# Patient Record
Sex: MALE | Race: WHITE | Employment: FULL TIME | URBAN - METROPOLITAN AREA
[De-identification: names, ages, dates, MRNs, and addresses within clinical notes are randomized per-mention and may not be internally consistent; named-entity substitution may affect disease eponyms.]

---

## 2017-04-18 PROBLEM — M17.11 PRIMARY OSTEOARTHRITIS OF RIGHT KNEE: Status: ACTIVE | Noted: 2017-04-18

## 2017-04-18 PROBLEM — S83.241A ACUTE MEDIAL MENISCAL TEAR, RIGHT, INITIAL ENCOUNTER: Status: ACTIVE | Noted: 2017-04-18

## 2017-07-03 PROBLEM — S83.241D ACUTE MEDIAL MENISCAL TEAR, RIGHT, SUBSEQUENT ENCOUNTER: Status: ACTIVE | Noted: 2017-04-18

## 2017-07-09 PROBLEM — M25.661 DECREASED ROM OF RIGHT KNEE: Status: ACTIVE | Noted: 2017-07-09

## 2017-07-24 PROBLEM — Z13.6 SCREENING FOR CARDIOVASCULAR CONDITION: Status: ACTIVE | Noted: 2017-07-24

## 2018-05-23 ENCOUNTER — OFFICE VISIT (OUTPATIENT)
Dept: OTHER | Facility: HOSPITAL | Age: 63
End: 2018-05-23
Attending: FAMILY MEDICINE
Payer: OTHER MISCELLANEOUS

## 2018-05-23 ENCOUNTER — HOSPITAL ENCOUNTER (OUTPATIENT)
Dept: GENERAL RADIOLOGY | Facility: HOSPITAL | Age: 63
Discharge: HOME | End: 2018-05-23
Attending: FAMILY MEDICINE
Payer: OTHER MISCELLANEOUS

## 2018-05-23 DIAGNOSIS — R07.89 CHEST WALL PAIN: Primary | ICD-10-CM

## 2018-05-23 DIAGNOSIS — M25.512 ACUTE PAIN OF LEFT SHOULDER: ICD-10-CM

## 2018-05-23 PROCEDURE — 73030 X-RAY EXAM OF SHOULDER: CPT | Performed by: FAMILY MEDICINE

## 2018-05-23 PROCEDURE — 73000 X-RAY EXAM OF COLLAR BONE: CPT | Performed by: FAMILY MEDICINE

## 2018-05-23 RX ORDER — TRAMADOL HYDROCHLORIDE 50 MG/1
TABLET ORAL
Qty: 15 TABLET | Refills: 0 | Status: SHIPPED | OUTPATIENT
Start: 2018-05-23 | End: 2018-09-13 | Stop reason: ALTCHOICE

## 2018-05-25 ENCOUNTER — APPOINTMENT (OUTPATIENT)
Dept: OTHER | Facility: HOSPITAL | Age: 63
End: 2018-05-25
Attending: PREVENTIVE MEDICINE
Payer: OTHER MISCELLANEOUS

## 2018-05-29 ENCOUNTER — OFFICE VISIT (OUTPATIENT)
Dept: PHYSICAL THERAPY | Age: 63
End: 2018-05-29
Attending: INTERNAL MEDICINE
Payer: OTHER MISCELLANEOUS

## 2018-05-29 PROCEDURE — 97530 THERAPEUTIC ACTIVITIES: CPT

## 2018-05-29 PROCEDURE — 97162 PT EVAL MOD COMPLEX 30 MIN: CPT

## 2018-05-29 NOTE — PROGRESS NOTES
UPPER EXTREMITY EVALUATION:   Referring Physician: Dr. Alanna Luther  Diagnosis: Left shoulder strain (X14.366D)       Date of Service: 5/29/2018     PATIENT SUMMARY   Oswald Copeland is a 63year old y/o male who presents to therapy today with compl perform work duties including shoveling, lifting without shoulder pain. Pt goals include \"get better\". Past medical history was reviewed with Emmie Runner.  Significant findings include hx kidney cancer, R knee arthroscopic meniscus repair 06/17        ASSESSMEN 5/5  Extension: R 5/5; L 5/5  Wrist flexion: 5/5 B  Wrist extension: 5/5 B Rhomboids: R5/5, L 5/5  Mid trap: R 5/5; L 4/5   Lats: R 5/5, L 4+/5  Low trap: R 4+/5; L 4+/5     Special tests:   Nicole-Anatoliy Impingement Sign: R -, L +  Painful Arc Sign: R - strength to 5/5 to promote improved shoulder mechanics and stabilization with ADL such as lifting and reaching   · Pt will be independent and compliant with comprehensive HEP to maintain progress achieved in PT      Frequency / Duration: Patient will be se

## 2018-05-31 ENCOUNTER — APPOINTMENT (OUTPATIENT)
Dept: OTHER | Facility: HOSPITAL | Age: 63
End: 2018-05-31
Attending: PREVENTIVE MEDICINE
Payer: OTHER MISCELLANEOUS

## 2018-06-05 ENCOUNTER — OFFICE VISIT (OUTPATIENT)
Dept: PHYSICAL THERAPY | Age: 63
End: 2018-06-05
Attending: PREVENTIVE MEDICINE
Payer: OTHER MISCELLANEOUS

## 2018-06-05 DIAGNOSIS — S46.912A LEFT SHOULDER STRAIN: ICD-10-CM

## 2018-06-05 PROCEDURE — 97140 MANUAL THERAPY 1/> REGIONS: CPT

## 2018-06-05 PROCEDURE — 97110 THERAPEUTIC EXERCISES: CPT

## 2018-06-05 PROCEDURE — 97530 THERAPEUTIC ACTIVITIES: CPT

## 2018-06-05 NOTE — PROGRESS NOTES
Dx: Left shoulder strain (I53.069E)               Authorized # of Visits:  6 visits         Next MD visit: none scheduled  Fall Risk: standard         Precautions: 5 lb weight restriction     Subjective: Felt confident leaving after seeing Aria for [employer] retraction for 1-2 days for neuromuscular re-education of postural cues and positioning GH joint more posteriorly.    Date: 6/5/2018 TX#: 2/6 Date:               TX#: 3/   Date:               TX#: 4/ Date:               TX#: 5/ Date:               TX#: 6/ D pain. Denies issues with getting dressed. Pt reports fatigues with driving, especially if arm resting on open window when driving. Notes some symptoms in \"pec wall and in trapezius muscle\". Pt has follow-up with physician on Thursday (5/31/18) morning. including: lifting, shoveling, driving >1 hour. David Mckeon would benefit from skilled Physical Therapy to address the above impairments to return to [facility] and return to work.      Precautions:  None  OBJECTIVE:   Observation/Posture: forward head/rounded shoulde examination and history, this evaluation shows involvement of 3-4 body structures involved / activity limitations, 1-2 personal factors / comorbidities and the condition is evolving due to changing clinical characteristics indicating moderate complexity. [phone]    Sincerely,  Electronically signed by therapist: Daisha Lim, PT    Physician's certification required: Yes  I certify the need for these services furnished under this plan of treatment and while under my care.     X__________________________

## 2018-06-07 ENCOUNTER — OFFICE VISIT (OUTPATIENT)
Dept: PHYSICAL THERAPY | Age: 63
End: 2018-06-07
Attending: PREVENTIVE MEDICINE
Payer: OTHER MISCELLANEOUS

## 2018-06-07 DIAGNOSIS — S46.912A LEFT SHOULDER STRAIN: ICD-10-CM

## 2018-06-07 PROCEDURE — 97530 THERAPEUTIC ACTIVITIES: CPT

## 2018-06-07 PROCEDURE — 97110 THERAPEUTIC EXERCISES: CPT

## 2018-06-07 NOTE — PROGRESS NOTES
Dx: Left shoulder strain (U74.828B)               Authorized # of Visits:  6 visits         Next MD visit: none scheduled  Fall Risk: standard         Precautions: 5 lb weight restriction     Subjective: Tender after therapy. Notice some more popping.  Pull Continue PT to improve ROM, AC and GH joint mobility, and posterior cuff/scapular functional strength. Patient would also benefit from cervical stretching.  He may benefit from Storm taping for shoulder retraction for 1-2 days for neuromuscular re-educa shoveling. Later in the afternoon, felt fatigue and weakness in arm. Pt reached across body in the shower and had sharp pain. Sitting on the couch later that night, had pain in \"shoulder area and joint area\".  Pt reports some throbbing in collar bone area anterior shoulder joint; painful with overpressure into external rotation of shoulder; muscle tightness to include L pectoralis and B UT; mild postural weakness to include 4/5 at L middle trap; hypomobility clavicle at [facility] joint on L and positive Hawkin's K symmetrical and -    Today’s Treatment and Response: Patient education provided on exam findings, healing process, pain education, proper body mechanics, activity modification, correct exercise technique, POC.    Patient was instructed in and issued a HEP f exercise program instructions;     Education or treatment limitation: None  Rehab Potential:excellent    FOTO: 54/100      Patient/Family/Caregiver was advised of these findings, precautions, and treatment options and has agreed to actively participate in

## 2018-06-08 ENCOUNTER — OFFICE VISIT (OUTPATIENT)
Dept: PHYSICAL THERAPY | Age: 63
End: 2018-06-08
Attending: INTERNAL MEDICINE
Payer: OTHER MISCELLANEOUS

## 2018-06-08 PROCEDURE — 97110 THERAPEUTIC EXERCISES: CPT

## 2018-06-08 PROCEDURE — 97140 MANUAL THERAPY 1/> REGIONS: CPT

## 2018-06-08 PROCEDURE — 97112 NEUROMUSCULAR REEDUCATION: CPT

## 2018-06-08 NOTE — PROGRESS NOTES
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  Didn't waist to head height of 25# with no increase in shoulder pain  · Pt will demonstrate increased mid/low trap strength to 5/5 to promote improved shoulder mechanics and stabilization with ADL such as lifting and reaching   · Pt will be independent and compli performed [address] inferior glide end grade 2 for pain relief x 15 total       Skilled Services:  To restore shoulder ROM and improve GH position with posterior cuff and scapular training    Charges:  Manual x 1 (15), TE x 2 (29), NM x 1 (10)   Total Timed Treatm soreness all the time, 3/10 currently, can get to 0/10 with aspirin and light activity, at worst last 48 hours up to 5/10 .    Current functional limitations include washing self in shower, lifting (has weight restriction), driving greater >1 hour, shovelin PROM:   Shoulder Overpressure     ER: R pain; L pain     Accessory motion: symmetrical but hypomobile in [address] joint, decreased AC joint mobility at clavicle B      Strength/MMT:  Shoulder Elbow Scapular   Flexion: R 5/5; L 5/5  Abduction: R 5/5; L 5/ be able to reach in back pocket, tuck in shirt, and turn steering wheel without pain  · Pt will lift load from ground to waist height of 50# with no increase in shoulder pain  · Pt will lift load from waist to head height of 25# with no increase in shoulde

## 2018-06-12 ENCOUNTER — OFFICE VISIT (OUTPATIENT)
Dept: PHYSICAL THERAPY | Age: 63
End: 2018-06-12
Attending: PREVENTIVE MEDICINE
Payer: OTHER MISCELLANEOUS

## 2018-06-12 DIAGNOSIS — S46.912A LEFT SHOULDER STRAIN: ICD-10-CM

## 2018-06-12 PROCEDURE — 97140 MANUAL THERAPY 1/> REGIONS: CPT

## 2018-06-12 PROCEDURE — 97110 THERAPEUTIC EXERCISES: CPT

## 2018-06-12 PROCEDURE — 97112 NEUROMUSCULAR REEDUCATION: CPT

## 2018-06-12 NOTE — PROGRESS NOTES
Progress  Summary    Dx: Left shoulder strain (F07.853P)               Authorized # of Visits:  6 visits         Next MD visit: June 14th, 2018  Fall Risk: standard         Precautions: 5 lb weight restriction     Pt has attended 5, cancelled 0, and n pain at end range at top of shoulder joint, L IR 85 with pain at end range. 2.Tender at L long head and short head of bicep. 3.  Positive empty and full can test with pain greatest at empty can. Patient able to perform cross arm test without pain.    4 PT    [de-identified] certification required: Yes  I certify the need for these services furnished under this plan of treatment and while under my care.     X___________________________________________________ Date____________________    Certification From: 6/1 inferior glide end grade 2 for pain relief x 15 total       Skilled Services:  To restore shoulder ROM and improve GH position with posterior cuff and scapular training    Charges:  Manual x 1 (10), TE x 2 (25), NM x 1 (10)   Total Timed Treatment: 45 min time, 3/10 currently, can get to 0/10 with aspirin and light activity, at worst last 48 hours up to 5/10 . Current functional limitations include washing self in shower, lifting (has weight restriction), driving greater >1 hour, shoveling.    Michelle [facility]ter Shoulder Overpressure     ER: R pain; L pain     Accessory motion: symmetrical but hypomobile in [address] joint, decreased AC joint mobility at clavicle B      Strength/MMT:  Shoulder Elbow Scapular   Flexion: R 5/5; L 5/5  Abduction: R 5/5; L 5/5  ER: R 5/5; reach in back pocket, tuck in shirt, and turn steering wheel without pain  · Pt will lift load from ground to waist height of 50# with no increase in shoulder pain  · Pt will lift load from waist to head height of 25# with no increase in shoulder pain  · P

## 2018-06-14 ENCOUNTER — TELEPHONE (OUTPATIENT)
Dept: PHYSICAL THERAPY | Age: 63
End: 2018-06-14

## 2018-06-14 ENCOUNTER — APPOINTMENT (OUTPATIENT)
Dept: PHYSICAL THERAPY | Age: 63
End: 2018-06-14
Attending: PREVENTIVE MEDICINE
Payer: OTHER MISCELLANEOUS

## 2018-06-14 ENCOUNTER — APPOINTMENT (OUTPATIENT)
Dept: OTHER | Facility: HOSPITAL | Age: 63
End: 2018-06-14
Attending: PREVENTIVE MEDICINE
Payer: OTHER MISCELLANEOUS

## 2018-06-19 ENCOUNTER — APPOINTMENT (OUTPATIENT)
Dept: PHYSICAL THERAPY | Age: 63
End: 2018-06-19
Attending: PREVENTIVE MEDICINE
Payer: OTHER MISCELLANEOUS

## 2018-06-21 ENCOUNTER — APPOINTMENT (OUTPATIENT)
Dept: PHYSICAL THERAPY | Age: 63
End: 2018-06-21
Attending: PREVENTIVE MEDICINE
Payer: OTHER MISCELLANEOUS

## 2018-06-21 ENCOUNTER — APPOINTMENT (OUTPATIENT)
Dept: OTHER | Facility: HOSPITAL | Age: 63
End: 2018-06-21
Attending: PREVENTIVE MEDICINE
Payer: OTHER MISCELLANEOUS

## 2018-06-26 ENCOUNTER — APPOINTMENT (OUTPATIENT)
Dept: PHYSICAL THERAPY | Age: 63
End: 2018-06-26
Attending: PREVENTIVE MEDICINE
Payer: OTHER MISCELLANEOUS

## 2018-06-28 ENCOUNTER — APPOINTMENT (OUTPATIENT)
Dept: PHYSICAL THERAPY | Age: 63
End: 2018-06-28
Attending: PREVENTIVE MEDICINE
Payer: OTHER MISCELLANEOUS

## 2018-07-03 ENCOUNTER — APPOINTMENT (OUTPATIENT)
Dept: PHYSICAL THERAPY | Age: 63
End: 2018-07-03
Attending: PREVENTIVE MEDICINE
Payer: OTHER MISCELLANEOUS

## 2018-07-05 ENCOUNTER — APPOINTMENT (OUTPATIENT)
Dept: PHYSICAL THERAPY | Age: 63
End: 2018-07-05
Attending: PREVENTIVE MEDICINE
Payer: OTHER MISCELLANEOUS

## 2018-07-12 PROBLEM — M75.122 COMPLETE TEAR OF LEFT ROTATOR CUFF: Status: ACTIVE | Noted: 2018-07-12

## 2018-07-26 PROBLEM — S43.432D SUPERIOR GLENOID LABRUM LESION OF LEFT SHOULDER, SUBSEQUENT ENCOUNTER: Status: ACTIVE | Noted: 2018-07-26

## 2019-02-01 PROBLEM — Z47.89 ORTHOPEDIC AFTERCARE: Status: ACTIVE | Noted: 2019-02-01

## 2021-02-03 NOTE — IMAGING NOTE
Call placed to pt regarding CTA Gated Thoracic on 2/5. Left a detailed voice message. Instructed to arrive 15 mins before procedure. Instructed to drink plenty of fluids a day prior to procedure and day of procedure. May eat a light breakfast/lunch.  Gaylin Litten

## 2021-02-05 ENCOUNTER — HOSPITAL ENCOUNTER (OUTPATIENT)
Dept: CT IMAGING | Facility: HOSPITAL | Age: 66
Discharge: HOME | End: 2021-02-05
Attending: INTERNAL MEDICINE
Payer: COMMERCIAL

## 2021-02-22 NOTE — IMAGING NOTE
Call placed to pt regarding CTA Gated Thoracic on 2/26/21. Instructed to arrive 15 mins before procedure. Instructed to drink plenty of fluids a day prior to procedure and day of procedure.  May eat a light breakfast. Advised to hold caffeine 12 hrs prior

## 2021-02-26 ENCOUNTER — HOSPITAL ENCOUNTER (OUTPATIENT)
Dept: CT IMAGING | Facility: HOSPITAL | Age: 66
Discharge: HOME | End: 2021-02-26
Attending: INTERNAL MEDICINE
Payer: COMMERCIAL

## 2021-02-26 VITALS — DIASTOLIC BLOOD PRESSURE: 90 MMHG | SYSTOLIC BLOOD PRESSURE: 139 MMHG | HEART RATE: 60 BPM

## 2021-02-26 DIAGNOSIS — I77.89 ENLARGED AORTA (HCC): ICD-10-CM

## 2021-02-26 LAB — CREAT BLD-MCNC: 1.5 MG/DL

## 2021-02-26 PROCEDURE — 82565 ASSAY OF CREATININE: CPT

## 2021-02-26 PROCEDURE — 71275 CT ANGIOGRAPHY CHEST: CPT | Performed by: INTERNAL MEDICINE

## 2021-02-26 NOTE — IMAGING NOTE
Received Az Churchill to CT Rm 4 [facility]. Verified name, , allergies. . IV access with 20G angiocath to right antecubital area. O2 applied via nasal cannula @ 2LPM.  Positioned pt on table.  Right AC, 20 GProcedure explain

## 2021-03-29 PROBLEM — M23.92 INTERNAL DERANGEMENT OF KNEE, LEFT: Status: ACTIVE | Noted: 2021-03-29

## 2021-04-05 PROBLEM — S83.242D ACUTE MEDIAL MENISCAL TEAR, LEFT, SUBSEQUENT ENCOUNTER: Status: ACTIVE | Noted: 2021-04-05

## 2021-04-22 PROBLEM — M25.662 DECREASED RANGE OF MOTION (ROM) OF LEFT KNEE: Status: ACTIVE | Noted: 2021-04-22

## 2021-12-03 ENCOUNTER — OCC HEALTH (OUTPATIENT)
Dept: OTHER | Facility: HOSPITAL | Age: 66
End: 2021-12-03
Attending: PREVENTIVE MEDICINE

## 2021-12-03 ENCOUNTER — HOSPITAL ENCOUNTER (OUTPATIENT)
Dept: GENERAL RADIOLOGY | Facility: HOSPITAL | Age: 66
Discharge: HOME | End: 2021-12-03
Attending: PREVENTIVE MEDICINE

## 2021-12-03 DIAGNOSIS — S83.91XA RIGHT KNEE SPRAIN: Primary | ICD-10-CM

## 2021-12-03 DIAGNOSIS — S83.91XA RIGHT KNEE SPRAIN: ICD-10-CM

## 2021-12-03 PROCEDURE — 73562 X-RAY EXAM OF KNEE 3: CPT | Performed by: PREVENTIVE MEDICINE

## 2021-12-08 ENCOUNTER — OFFICE VISIT (OUTPATIENT)
Dept: OTHER | Facility: HOSPITAL | Age: 66
End: 2021-12-08
Attending: PREVENTIVE MEDICINE

## 2021-12-08 DIAGNOSIS — S86.911D KNEE STRAIN, RIGHT, SUBSEQUENT ENCOUNTER: ICD-10-CM

## 2021-12-08 DIAGNOSIS — M25.561 ACUTE PAIN OF RIGHT KNEE: Primary | ICD-10-CM

## 2021-12-20 ENCOUNTER — APPOINTMENT (OUTPATIENT)
Dept: OTHER | Facility: HOSPITAL | Age: 66
End: 2021-12-20
Attending: PREVENTIVE MEDICINE

## 2023-12-22 ENCOUNTER — TELEPHONIC VISIT (OUTPATIENT)
Dept: URGENT CARE | Age: 68
End: 2023-12-22

## 2023-12-22 VITALS
WEIGHT: 203.48 LBS | HEART RATE: 74 BPM | BODY MASS INDEX: 30.14 KG/M2 | HEIGHT: 69 IN | TEMPERATURE: 98 F | DIASTOLIC BLOOD PRESSURE: 94 MMHG | OXYGEN SATURATION: 97 % | SYSTOLIC BLOOD PRESSURE: 142 MMHG

## 2023-12-22 DIAGNOSIS — Z20.822 EXPOSURE TO COVID-19 VIRUS: Primary | ICD-10-CM

## 2023-12-22 DIAGNOSIS — J06.9 ACUTE UPPER RESPIRATORY INFECTION: ICD-10-CM

## 2023-12-22 LAB
FLUAV AG UPPER RESP QL IA.RAPID: NEGATIVE
FLUBV AG UPPER RESP QL IA.RAPID: NEGATIVE
SARS-COV+SARS-COV-2 AG RESP QL IA.RAPID: NOT DETECTED
TEST LOT EXPIRATION DATE: NORMAL
TEST LOT NUMBER: NORMAL

## 2023-12-22 PROCEDURE — 87428 SARSCOV & INF VIR A&B AG IA: CPT | Performed by: NURSE PRACTITIONER

## 2023-12-22 PROCEDURE — 99203 OFFICE O/P NEW LOW 30 MIN: CPT | Performed by: NURSE PRACTITIONER

## 2023-12-22 ASSESSMENT — ENCOUNTER SYMPTOMS
SORE THROAT: 0
RHINORRHEA: 1
CONSTITUTIONAL NEGATIVE: 1
RESPIRATORY NEGATIVE: 1
EYES NEGATIVE: 1

## 2024-09-17 ENCOUNTER — APPOINTMENT (OUTPATIENT)
Dept: GENERAL RADIOLOGY | Age: 69
End: 2024-09-17
Attending: EMERGENCY MEDICINE
Payer: COMMERCIAL

## 2024-09-17 ENCOUNTER — HOSPITAL ENCOUNTER (INPATIENT)
Facility: HOSPITAL | Age: 69
LOS: 3 days | Discharge: HOME | End: 2024-09-20
Attending: EMERGENCY MEDICINE | Admitting: HOSPITALIST
Payer: COMMERCIAL

## 2024-09-17 ENCOUNTER — APPOINTMENT (OUTPATIENT)
Dept: INTERVENTIONAL RADIOLOGY/VASCULAR | Facility: HOSPITAL | Age: 69
End: 2024-09-17
Attending: INTERNAL MEDICINE
Payer: COMMERCIAL

## 2024-09-17 ENCOUNTER — APPOINTMENT (OUTPATIENT)
Dept: CV DIAGNOSTICS | Facility: HOSPITAL | Age: 69
End: 2024-09-17
Attending: INTERNAL MEDICINE
Payer: COMMERCIAL

## 2024-09-17 DIAGNOSIS — I77.89 ENLARGED THORACIC AORTA (HCC): Primary | ICD-10-CM

## 2024-09-17 DIAGNOSIS — I21.3 ST ELEVATION MYOCARDIAL INFARCTION (STEMI), UNSPECIFIED ARTERY (HCC): ICD-10-CM

## 2024-09-17 PROBLEM — R07.9 CHEST PAIN OF UNCERTAIN ETIOLOGY: Status: ACTIVE | Noted: 2024-09-17

## 2024-09-17 LAB
ALBUMIN SERPL-MCNC: 4 G/DL (ref 3.4–5)
ALBUMIN/GLOB SERPL: 1.2 {RATIO} (ref 1–2)
ALP LIVER SERPL-CCNC: 80 U/L
ALT SERPL-CCNC: 29 U/L
ANION GAP SERPL CALC-SCNC: 6 MMOL/L (ref 0–18)
APTT PPP: 26.2 SECONDS (ref 23–36)
AST SERPL-CCNC: 16 U/L (ref 15–37)
BASOPHILS # BLD AUTO: 0.11 X10(3) UL (ref 0–0.2)
BASOPHILS NFR BLD AUTO: 1.3 %
BILIRUB SERPL-MCNC: 0.8 MG/DL (ref 0.1–2)
BUN BLD-MCNC: 23 MG/DL (ref 9–23)
CALCIUM BLD-MCNC: 9.8 MG/DL (ref 8.5–10.1)
CHLORIDE SERPL-SCNC: 105 MMOL/L (ref 98–112)
CHOLEST SERPL-MCNC: 160 MG/DL (ref ?–200)
CO2 SERPL-SCNC: 26 MMOL/L (ref 21–32)
CREAT BLD-MCNC: 1.75 MG/DL
EGFRCR SERPLBLD CKD-EPI 2021: 42 ML/MIN/1.73M2 (ref 60–?)
EOSINOPHIL # BLD AUTO: 0.37 X10(3) UL (ref 0–0.7)
EOSINOPHIL NFR BLD AUTO: 4.3 %
ERYTHROCYTE [DISTWIDTH] IN BLOOD BY AUTOMATED COUNT: 13.1 %
GLOBULIN PLAS-MCNC: 3.3 G/DL (ref 2.8–4.4)
GLUCOSE BLD-MCNC: 115 MG/DL (ref 70–99)
HCT VFR BLD AUTO: 47.2 %
HDLC SERPL-MCNC: 43 MG/DL (ref 40–59)
HGB BLD-MCNC: 16.9 G/DL
IMM GRANULOCYTES # BLD AUTO: 0.02 X10(3) UL (ref 0–1)
IMM GRANULOCYTES NFR BLD: 0.2 %
LDLC SERPL CALC-MCNC: 98 MG/DL (ref ?–100)
LYMPHOCYTES # BLD AUTO: 3.15 X10(3) UL (ref 1–4)
LYMPHOCYTES NFR BLD AUTO: 36.6 %
MCH RBC QN AUTO: 30.2 PG (ref 26–34)
MCHC RBC AUTO-ENTMCNC: 35.8 G/DL (ref 31–37)
MCV RBC AUTO: 84.3 FL
MONOCYTES # BLD AUTO: 0.95 X10(3) UL (ref 0.1–1)
MONOCYTES NFR BLD AUTO: 11 %
MRSA DNA SPEC QL NAA+PROBE: NEGATIVE
NEUTROPHILS # BLD AUTO: 4 X10 (3) UL (ref 1.5–7.7)
NEUTROPHILS # BLD AUTO: 4 X10(3) UL (ref 1.5–7.7)
NEUTROPHILS NFR BLD AUTO: 46.6 %
NONHDLC SERPL-MCNC: 117 MG/DL (ref ?–130)
OSMOLALITY SERPL CALC.SUM OF ELEC: 289 MOSM/KG (ref 275–295)
PLATELET # BLD AUTO: 241 10(3)UL (ref 150–450)
POTASSIUM SERPL-SCNC: 3.9 MMOL/L (ref 3.5–5.1)
PROT SERPL-MCNC: 7.3 G/DL (ref 6.4–8.2)
RBC # BLD AUTO: 5.6 X10(6)UL
SODIUM SERPL-SCNC: 137 MMOL/L (ref 136–145)
TRIGL SERPL-MCNC: 102 MG/DL (ref 30–149)
TROPONIN I SERPL HS-MCNC: 172 NG/L
TROPONIN I SERPL HS-MCNC: 37 NG/L
VLDLC SERPL CALC-MCNC: 17 MG/DL (ref 0–30)
WBC # BLD AUTO: 8.6 X10(3) UL (ref 4–11)

## 2024-09-17 PROCEDURE — 87641 MR-STAPH DNA AMP PROBE: CPT | Performed by: HOSPITALIST

## 2024-09-17 PROCEDURE — 93005 ELECTROCARDIOGRAM TRACING: CPT

## 2024-09-17 PROCEDURE — 93010 ELECTROCARDIOGRAM REPORT: CPT | Performed by: INTERNAL MEDICINE

## 2024-09-17 PROCEDURE — 84484 ASSAY OF TROPONIN QUANT: CPT

## 2024-09-17 PROCEDURE — 85025 COMPLETE CBC W/AUTO DIFF WBC: CPT

## 2024-09-17 PROCEDURE — 85730 THROMBOPLASTIN TIME PARTIAL: CPT | Performed by: EMERGENCY MEDICINE

## 2024-09-17 PROCEDURE — 96375 TX/PRO/DX INJ NEW DRUG ADDON: CPT

## 2024-09-17 PROCEDURE — 71045 X-RAY EXAM CHEST 1 VIEW: CPT | Performed by: EMERGENCY MEDICINE

## 2024-09-17 PROCEDURE — 93306 TTE W/DOPPLER COMPLETE: CPT | Performed by: INTERNAL MEDICINE

## 2024-09-17 PROCEDURE — 80053 COMPREHEN METABOLIC PANEL: CPT

## 2024-09-17 PROCEDURE — 93010 ELECTROCARDIOGRAM REPORT: CPT

## 2024-09-17 PROCEDURE — 99285 EMERGENCY DEPT VISIT HI MDM: CPT

## 2024-09-17 PROCEDURE — 99291 CRITICAL CARE FIRST HOUR: CPT

## 2024-09-17 PROCEDURE — 84484 ASSAY OF TROPONIN QUANT: CPT | Performed by: EMERGENCY MEDICINE

## 2024-09-17 PROCEDURE — 99153 MOD SED SAME PHYS/QHP EA: CPT | Performed by: INTERNAL MEDICINE

## 2024-09-17 PROCEDURE — 4A023N7 MEASUREMENT OF CARDIAC SAMPLING AND PRESSURE, LEFT HEART, PERCUTANEOUS APPROACH: ICD-10-PCS | Performed by: INTERNAL MEDICINE

## 2024-09-17 PROCEDURE — 80061 LIPID PANEL: CPT | Performed by: EMERGENCY MEDICINE

## 2024-09-17 PROCEDURE — 96365 THER/PROPH/DIAG IV INF INIT: CPT

## 2024-09-17 PROCEDURE — B211YZZ FLUOROSCOPY OF MULTIPLE CORONARY ARTERIES USING OTHER CONTRAST: ICD-10-PCS | Performed by: INTERNAL MEDICINE

## 2024-09-17 PROCEDURE — 99152 MOD SED SAME PHYS/QHP 5/>YRS: CPT | Performed by: INTERNAL MEDICINE

## 2024-09-17 PROCEDURE — 027035Z DILATION OF CORONARY ARTERY, ONE ARTERY WITH TWO DRUG-ELUTING INTRALUMINAL DEVICES, PERCUTANEOUS APPROACH: ICD-10-PCS | Performed by: INTERNAL MEDICINE

## 2024-09-17 PROCEDURE — 93454 CORONARY ARTERY ANGIO S&I: CPT | Performed by: INTERNAL MEDICINE

## 2024-09-17 RX ORDER — PROCHLORPERAZINE EDISYLATE 5 MG/ML
10 INJECTION INTRAMUSCULAR; INTRAVENOUS EVERY 6 HOURS PRN
Status: DISCONTINUED | OUTPATIENT
Start: 2024-09-17 | End: 2024-09-20

## 2024-09-17 RX ORDER — NITROGLYCERIN 0.4 MG/1
0.4 TABLET SUBLINGUAL ONCE
Status: COMPLETED | OUTPATIENT
Start: 2024-09-17 | End: 2024-09-17

## 2024-09-17 RX ORDER — HEPARIN SODIUM 1000 [USP'U]/ML
5000 INJECTION, SOLUTION INTRAVENOUS; SUBCUTANEOUS ONCE
Status: COMPLETED | OUTPATIENT
Start: 2024-09-17 | End: 2024-09-17

## 2024-09-17 RX ORDER — ASPIRIN 81 MG/1
162 TABLET, CHEWABLE ORAL ONCE
Status: DISCONTINUED | OUTPATIENT
Start: 2024-09-17 | End: 2024-09-18 | Stop reason: ALTCHOICE

## 2024-09-17 RX ORDER — NICARDIPINE HYDROCHLORIDE 2.5 MG/ML
INJECTION INTRAVENOUS
Status: COMPLETED
Start: 2024-09-17 | End: 2024-09-17

## 2024-09-17 RX ORDER — ASPIRIN 81 MG/1
81 TABLET ORAL DAILY
Status: DISCONTINUED | OUTPATIENT
Start: 2024-09-18 | End: 2024-09-19

## 2024-09-17 RX ORDER — HEPARIN SODIUM AND DEXTROSE 10000; 5 [USP'U]/100ML; G/100ML
INJECTION INTRAVENOUS CONTINUOUS
Status: DISCONTINUED | OUTPATIENT
Start: 2024-09-17 | End: 2024-09-17

## 2024-09-17 RX ORDER — HEPARIN SODIUM AND DEXTROSE 10000; 5 [USP'U]/100ML; G/100ML
1000 INJECTION INTRAVENOUS ONCE
Status: DISCONTINUED | OUTPATIENT
Start: 2024-09-17 | End: 2024-09-17

## 2024-09-17 RX ORDER — LIDOCAINE HYDROCHLORIDE 10 MG/ML
INJECTION, SOLUTION EPIDURAL; INFILTRATION; INTRACAUDAL; PERINEURAL
Status: COMPLETED
Start: 2024-09-17 | End: 2024-09-17

## 2024-09-17 RX ORDER — HEPARIN SODIUM 5000 [USP'U]/ML
INJECTION, SOLUTION INTRAVENOUS; SUBCUTANEOUS
Status: COMPLETED
Start: 2024-09-17 | End: 2024-09-17

## 2024-09-17 RX ORDER — MORPHINE SULFATE 4 MG/ML
4 INJECTION, SOLUTION INTRAMUSCULAR; INTRAVENOUS ONCE
Status: COMPLETED | OUTPATIENT
Start: 2024-09-17 | End: 2024-09-17

## 2024-09-17 RX ORDER — SODIUM CHLORIDE 9 MG/ML
INJECTION, SOLUTION INTRAVENOUS CONTINUOUS
Status: ACTIVE | OUTPATIENT
Start: 2024-09-17 | End: 2024-09-17

## 2024-09-17 RX ORDER — ONDANSETRON 2 MG/ML
4 INJECTION INTRAMUSCULAR; INTRAVENOUS EVERY 6 HOURS PRN
Status: DISCONTINUED | OUTPATIENT
Start: 2024-09-17 | End: 2024-09-20

## 2024-09-17 RX ORDER — MELATONIN
9 NIGHTLY PRN
Status: DISCONTINUED | OUTPATIENT
Start: 2024-09-17 | End: 2024-09-20

## 2024-09-17 RX ORDER — MIDAZOLAM HYDROCHLORIDE 1 MG/ML
INJECTION INTRAMUSCULAR; INTRAVENOUS
Status: COMPLETED
Start: 2024-09-17 | End: 2024-09-17

## 2024-09-17 RX ORDER — ALBUTEROL SULFATE 90 UG/1
2 INHALANT RESPIRATORY (INHALATION) 4 TIMES DAILY
Status: DISCONTINUED | OUTPATIENT
Start: 2024-09-17 | End: 2024-09-17

## 2024-09-17 RX ORDER — METOPROLOL TARTRATE 25 MG/1
25 TABLET, FILM COATED ORAL
Status: DISCONTINUED | OUTPATIENT
Start: 2024-09-17 | End: 2024-09-17

## 2024-09-17 RX ORDER — NITROGLYCERIN 0.6 MG/1
0.6 TABLET SUBLINGUAL EVERY 5 MIN PRN
Status: DISCONTINUED | OUTPATIENT
Start: 2024-09-17 | End: 2024-09-17

## 2024-09-17 RX ORDER — LORAZEPAM 2 MG/ML
0.5 INJECTION INTRAMUSCULAR ONCE
Status: COMPLETED | OUTPATIENT
Start: 2024-09-18 | End: 2024-09-18

## 2024-09-17 RX ORDER — NITROGLYCERIN 0.4 MG/1
TABLET SUBLINGUAL
Status: COMPLETED
Start: 2024-09-17 | End: 2024-09-17

## 2024-09-17 RX ORDER — ROSUVASTATIN CALCIUM 20 MG/1
40 TABLET, COATED ORAL NIGHTLY
Status: DISCONTINUED | OUTPATIENT
Start: 2024-09-17 | End: 2024-09-20

## 2024-09-17 RX ORDER — LABETALOL HYDROCHLORIDE 5 MG/ML
10 INJECTION, SOLUTION INTRAVENOUS ONCE
Status: COMPLETED | OUTPATIENT
Start: 2024-09-17 | End: 2024-09-17

## 2024-09-17 RX ORDER — ALBUTEROL SULFATE 90 UG/1
2 INHALANT RESPIRATORY (INHALATION) EVERY 6 HOURS PRN
Status: DISCONTINUED | OUTPATIENT
Start: 2024-09-17 | End: 2024-09-20

## 2024-09-17 RX ORDER — ACETAMINOPHEN 500 MG
1000 TABLET ORAL EVERY 8 HOURS PRN
Status: DISCONTINUED | OUTPATIENT
Start: 2024-09-17 | End: 2024-09-20

## 2024-09-17 RX ORDER — METOPROLOL TARTRATE 25 MG/1
25 TABLET, FILM COATED ORAL
Status: DISCONTINUED | OUTPATIENT
Start: 2024-09-17 | End: 2024-09-20

## 2024-09-17 NOTE — PROCEDURES
[facility]       Billy Nielsen Location: Cath Lab    CSN [id number] MRN [id number]   Admission Date 9/17/2024 Procedure Date 9/17/2024   Attending Physician Davi Casper MD Procedure Physician Earle Zavala MD         CARDIAC CATHETERIZATION/PERCUTANEOUS CORONARY INTERVENTION      PREOPERATIVE DIAGNOSIS:  STEMI  POSTOPERATIVE DIAGNOSIS:  CAD  PROCEDURE PERFORMED:  Diagnostic angiogram, PCI to LAD with GIDEON for STEMI/AMI (acute MI)      PROCEDURE:  The patient was brought to the cardiac catheterization lab in the fasting state.  Informed consent was obtained.  Moderate sedation was employed using 4mg IV Versed and 100mcg IV fentanyl.  I directly observed the patient from 647 to 740, watching the heart rate, blood pressure, oximetry, and rhythm.  An independent, trained observer was present throughout the procedure and assisted in the monitoring of the patient's level of consciousness and physiologic states.  Please see the [facility] Catheterization report in Epic for further technical details.     ACCESS/CATHETER PLACEMENT:  6F right radial, slender sheath.  A Kasie catheter for LM and for RCA engagement and angiography.  Standard over the wire technique was utilized.  Standard Czech and JAMES angiographic views with caudal and cranial angulation were used. A TR band was used for arterial hemostasis.     FINDINGS:    Coronary angiography:    LMCA: The left main artery is normal.  LAD:  The left anterior descending artery is occluded proximally with thrombus, then severe proximal disease in a long lesion involving diagonals.  Then mid/distal 80% stenosis and diffuse mid/distal disease.  LCX: The left circumflex artery is mildly diseased vessel.   RCA:  The right coronary artery is dominant, large vessel.  95% distal stenosis with TIMI3 flow proximal to the crux.  Mild disease otherwise.    CORONARY INTERVENTION: PCI to LAD.  Heparin.  EBU 3.5 guide.  BMW in LAD.  Prowater in DX.  Predilated with 2.0mm balloon.  Stented  2.5x48mm Skypoint GIDEON from proximal into mid vessel and 2.55t33ss Skypoint in mid/distal (with 15mm between stents).  I unjailed the DX and PTCA'd the DX with the 2.0mm balloon.  I post dilated the proximal stent with a 3.5mm NC to high pressure.  I gave cardene for spasm and took final angiographic pictures.  I spared what contrast I could.  Perclose for hemostasis.     MEDICATIONS:  See nursing records, [facility] database, and EMR.     COMPLICATIONS:  None.     IMPRESSION:    STEMI, anterior  CAD  Occluded proximal LAD  Severe proximal LAD disease  Severe mid/distal LAD disease  Subtotally occluded distal RCA with TIMI3 flow  3.    S/P PCI to LAD with GIDEON x 2    RECOMMENDATIONS:   CCU.  Hemodynamically stable.  Echo.  Med manage post STEMI.  PCI to RCA Thursday if Cr ok.  Dr. Diaz to assist management (outpatient cardiologist).    Earle Zavala MD

## 2024-09-17 NOTE — CONSULTS
[facility]  Report of Consultation    Billy Nielsen Patient Status:  Emergency    1955 MRN [id number]   Location [facility] INTERVENTIONAL SUITES Attending No att. providers found   Hosp Day # 0 PCP Carlton Li MD     Reason for Consultation:   STEMI    History of Present Illness:   Billy Nielsen is a(n) 68 year old male with cardiac RF and one kidney and CKD.    Had CP/throat pain develop at 230am.  Waxed and waned.  At rest.  Worse with ambulation.  New discomfort.  Severe at times.  Associated SOB/TITUS.  \"Panting in the ER\".  Improved with meds.    In the ER was given heparin bolus and drip.  ASA.    EKGS done and determined to meet STEMI criteria on EKG #3 by ER and informed Dr. Wise from DM.      Patient taken directly from [place] ER to lab.    There he is comfortable with mild residual throat pain but \"no chest pain\".    Otherwise is at baseline.  Follows with Dr. Diaz and evidently was scheduled to undergo CTA in near future.    At baseline he is active with no significant limitations; no angina, no TITUS with normal activity.  No recent palps.  No syncope.    Past Medical History:   Past Medical History:    Cancer (HCC)    right kidney cancer    Kidney stone on left side       Social History:   Smoking:  None  Alcohol:  None    Family History:   No family history of premature arthrosclerotic heart disease     Medications:   Scheduled:    ED initial dose heparin  1,000 Units/hr Intravenous Once    aspirin  162 mg Oral Once       Continuous Infusion:    continuous dose heparin         Outpatient Medications:   No current facility-administered medications on file prior to encounter.     Current Outpatient Medications on File Prior to Encounter   Medication Sig Dispense Refill    predniSONE 20 MG Oral Tab Take two po daily for 4 days then one po daily for 4 days then 1/2 po daily until done 15 tablet 0    Moxifloxacin HCl 400 MG Oral Tab Take 1 tablet (400 mg  total) by mouth daily. 10 tablet 0    prochlorperazine (COMPAZINE) 10 mg tablet Take 1 tablet (10 mg total) by mouth every 6 (six) hours as needed for Nausea. 20 tablet 0    azithromycin (ZITHROMAX Z-WINSTON) 250 MG Oral Tab Take two tablets today, then one tablet daily for 4 more days 6 tablet 0    benzonatate 200 MG Oral Cap Take 1 capsule (200 mg total) by mouth 3 (three) times daily as needed for cough. 30 capsule 0    albuterol (PROAIR HFA) 108 (90 Base) MCG/ACT Inhalation Aero Soln Inhale 2 puffs into the lungs 4 (four) times daily. 8 g 0    guaiFENesin-codeine (CHERATUSSIN AC) 100-10 MG/5ML Oral Solution Take 5 mL by mouth every 6 (six) hours as needed for cough. 118 mL 0    Fluticasone Propionate 50 MCG/ACT Nasal Suspension 2 sprays by Nasal route daily. 16 g 2       Allergies:   Allergies   Allergen Reactions    Nsaids OTHER (SEE COMMENTS)     Due to one kidney recommended not to take    Septra [Sulfamethoxazole W/Trimethoprim, Co-Trimoxazole] RASH and FEVER     Rash like a sunburn and temp       Review of Systems:   No fevers, chills, change in weight or bowel habits.  Ten point review of systems is otherwise negative or unremarkable.    Physical Exam:   Vitals:    09/17/24 0540   BP: (!) 160/97   Pulse: 60   Resp: 16   Temp:      Wt Readings from Last 3 Encounters:   09/17/24 213 lb (96.6 kg)   12/02/21 213 lb (96.6 kg)   06/24/21 214 lb (97.1 kg)           General: Well developed, well nourished male.  Pt is in no acute distress.  HEENT:   Normocephalic.  Atraumatic.  Eyes with no scleral icterus.  Neck: Supple.  No JVD.  Carotids 2+ and equal in symmetric fashion.  No bruits are noted.  Cardiac: Regular rate and rhythm.   There is a normal S1 and S2.  No S3 or S4.  No murmurs, rubs, or gallops.  PMI is non-displaced with a normal apical impulse.  Lungs: Clear to ascultation bilaterally.  No focal rales, rhonchi, or wheezes.  Good air movement is noted throughout all lung fields.  Abdomen: Soft.   Non-distended.  Non-tender.  Bowel sounds are present and normoactive.  No guarding or rebound.   Extremities: Extremities do not demonstrate any evidence of peripheral edema.   No cyanosis or clubbing of the digits is appreciated.  Femoral, Dorsalis Pedis, and Posterior Tibialis  pulses are 2+ and equal in a symmetric fashion.  Neurologic: Alert and oriented, normal affect.  No gross deficit appreciated.  Integument:  No visible rashes are appreciated.      Laboratories and Data:   Labs:    Recent Labs   Lab 09/17/24  0350   *   BUN 23   CREATSERUM 1.75*   CA 9.8   ALB 4.0      K 3.9      CO2 26.0   ALKPHO 80   AST 16   ALT 29   BILT 0.8   TP 7.3       Recent Labs   Lab 09/17/24  0350   RBC 5.60   HGB 16.9   HCT 47.2   MCV 84.3   MCH 30.2   MCHC 35.8   RDW 13.1   NEPRELIM 4.00   WBC 8.6   .0       No results for input(s): \"PTP\", \"INR\" in the last 168 hours.    No results for input(s): \"TROP\", \"CK\" in the last 168 hours.    Diagnostics:   Tele: NSR.  EKG: anterior RUBEN with Qs V1-V4. Sinus.  Inferior leads flat.        Assessment:  1. STEMI, anterior with Qs V1-V4 (suspect LAD infarct)  2. CAD  3. CKD, Cr 1.75  4. Hx Renal CA      Plan:  1. Angio, primary PCI, suspect LAD.  2. Med manage STEMI.  3. Med manage CAD.  4. IV hydration and spare contrast given Cr/CKD.    Earle Zavala MD

## 2024-09-17 NOTE — PLAN OF CARE
Received pt at 0800 from cath lab. Pt A/Ox4. VSS on RA. NSR on tele. Rt groin CDI, soft, no hematoma. Pedal pulses +1. Pt voiding. Pt up in the chair. Pt and pt's wife updated with plan of care.     1507 - Pt c/o throat pain and is having waves of nausea w/ one bought of emesis. Dr Diaz notified. EKG done. SL Nitrogly x1 given w/o relief.     1853 - Pt had c/o throat pain with nausea EKG done. Pt \"states it's not as strong as it was earlier.\" No new orders.    Problem: CARDIOVASCULAR - ADULT  Goal: Maintains optimal cardiac output and hemodynamic stability  Description: INTERVENTIONS:  - Monitor vital signs, rhythm, and trends  - Monitor for bleeding, hypotension and signs of decreased cardiac output  - Evaluate effectiveness of vasoactive medications to optimize hemodynamic stability  - Monitor arterial and/or venous puncture sites for bleeding and/or hematoma  - Assess quality of pulses, skin color and temperature  - Assess for signs of decreased coronary artery perfusion - ex. Angina  - Evaluate fluid balance, assess for edema, trend weights  Outcome: Progressing  Goal: Absence of cardiac arrhythmias or at baseline  Description: INTERVENTIONS:  - Continuous cardiac monitoring, monitor vital signs, obtain 12 lead EKG if indicated  - Evaluate effectiveness of antiarrhythmic and heart rate control medications as ordered  - Initiate emergency measures for life threatening arrhythmias  - Monitor electrolytes and administer replacement therapy as ordered  Outcome: Progressing     Problem: PAIN - ADULT  Goal: Verbalizes/displays adequate comfort level or patient's stated pain goal  Description: INTERVENTIONS:  - Encourage pt to monitor pain and request assistance  - Assess pain using appropriate pain scale  - Administer analgesics based on type and severity of pain and evaluate response  - Implement non-pharmacological measures as appropriate and evaluate response  - Consider cultural and social influences on pain  and pain management  - Manage/alleviate anxiety  - Utilize distraction and/or relaxation techniques  - Monitor for opioid side effects  - Notify MD/LIP if interventions unsuccessful or patient reports new pain  - Anticipate increased pain with activity and pre-medicate as appropriate  Outcome: Progressing     Problem: SAFETY ADULT - FALL  Goal: Free from fall injury  Description: INTERVENTIONS:  - Assess pt frequently for physical needs  - Identify cognitive and physical deficits and behaviors that affect risk of falls.  - [place] fall precautions as indicated by assessment.  - Educate pt/family on patient safety including physical limitations  - Instruct pt to call for assistance with activity based on assessment  - Modify environment to reduce risk of injury  - Provide assistive devices as appropriate  - Consider OT/PT consult to assist with strengthening/mobility  - Encourage toileting schedule  Outcome: Progressing     Problem: DISCHARGE PLANNING  Goal: Discharge to home or other facility with appropriate resources  Description: INTERVENTIONS:  - Identify barriers to discharge w/pt and caregiver  - Include patient/family/discharge partner in discharge planning  - Arrange for needed discharge resources and transportation as appropriate  - Identify discharge learning needs (meds, wound care, etc)  - Arrange for interpreters to assist at discharge as needed  - Consider post-discharge preferences of patient/family/discharge partner  - Complete POLST form as appropriate  - Assess patient's ability to be responsible for managing their own health  - Refer to Case Management Department for coordinating discharge planning if the patient needs post-hospital services based on physician/LIP order or complex needs related to functional status, cognitive ability or social support system  Outcome: Progressing

## 2024-09-17 NOTE — H&P
[facility] Hospitalist H&P/Consult note       CC:   Chief Complaint   Patient presents with    Chest Pain Angina        PCP: Carlton Li MD    History of Present Illness: Patient is a 68 year old male with PMH sig for CKD, solitary kidney here for chest pain    Ongoing for the past 1 month, on and off, usually with exertion goes away with rest. Saw cards in clinic and scheduled to have CTA coronary. Last night pain was more severe, non exertional, longer lasting thus came to the hospital. Found to have stemi. Currently in ICU, on bed rest     PMH  Past Medical History:    Cancer (HCC)    right kidney cancer    Kidney stone on left side        PSH  Past Surgical History:   Procedure Laterality Date    Colonoscopy,biopsy  10/11/2010    Performed by VALARIE ASTORGA at [facility] [place], [facility]    Knee arthroscp harv      Laparo radical nephrectomy  2004    Other surgical history       cysto, left rpg, left urs, left stent 6/15/13edw Dr Ashley    Other surgical history      Right knee arthroscopic meniscus repair        ALL:  Allergies   Allergen Reactions    Nsaids OTHER (SEE COMMENTS)     Due to one kidney recommended not to take    Septra [Sulfamethoxazole W/Trimethoprim, Co-Trimoxazole] RASH and FEVER     Rash like a sunburn and temp        Home Medications:  Outpatient Medications Marked as Taking for the 9/17/24 encounter (Hospital Encounter)   Medication Sig Dispense Refill    Fluticasone Propionate 50 MCG/ACT Nasal Suspension 2 sprays by Nasal route daily. 16 g 2         Soc Hx  Social History     Tobacco Use    Smoking status: Never     Passive exposure: Never    Smokeless tobacco: Never   Substance Use Topics    Alcohol use: Yes     Alcohol/week: 0.0 standard drinks of alcohol     Comment: SOCIAL/2 drinks per month        Fam Hx  Family History   Problem Relation Age of Onset    Cancer Father     Diabetes Mother     Other (Other) Mother        Review of Systems  Comprehensive ROS reviewed and negative except for  what's stated above.     OBJECTIVE:  BP (!) 160/97   Pulse 60   Temp 98.7 °F (37.1 °C) (Oral)   Resp 16   Ht 5' 8\" (1.727 m)   Wt 213 lb (96.6 kg)   SpO2 99%   BMI 32.39 kg/m²   General:  Alert, no distress, appears stated age.   Head:  Normocephalic, without obvious abnormality, atraumatic.   Eyes:  Sclera anicteric, No conjunctival pallor, EOMs intact.    Throat: dry   Neck: Supple    Lungs:   Clear to auscultation bilaterally. Normal effort   Chest wall:  No tenderness or deformity.   Heart:  Regular rate and rhythm    Abdomen:   Soft, NT/ND    Extremities: Atraumatic, no cyanosis or edema.   Skin: No visible rashes or lesions.           Diagnostic Data:    CBC/Chem  Recent Labs   Lab 09/17/24  0350   WBC 8.6   HGB 16.9   MCV 84.3   .0       Recent Labs   Lab 09/17/24  0350      K 3.9      CO2 26.0   BUN 23   CREATSERUM 1.75*   *   CA 9.8       Recent Labs   Lab 09/17/24  0350   ALT 29   AST 16   ALB 4.0       No results for input(s): \"TROP\" in the last 168 hours.         CXR: image personally reviewed     Radiology: No results found.     ASSESSMENT / PLAN:     Patient is a 68 year old male with PMH sig for CKD, solitary kidney here for chest pain    Impression    -chest pain  -STEMI, anterior    -ckd 3, baseline Cr ~1.5  -renal cancer sp nephrectomy    Plan    -sp angiogram, cath findings as able, ally to LAD, needs intervention to RCA as well  -dapt  Statin, LDL 98  IVFs  Daily bmp      Further recommendations pending patient's clinical course. [facility] hospitalist to continue to follow patient while in house    Patient and/or patient's family given opportunity to ask questions and note understanding and agreeing with therapeutic plan as outlined    Rolf Christianson Hospitalist  [phone]  Answering Service: [phone]

## 2024-09-17 NOTE — ED PROVIDER NOTES
Patient Seen in: [facility] In [place]      History     Chief Complaint   Patient presents with    Chest Pain Angina     Stated Complaint: says hes havng a heart attack    Subjective:   HPI    Patient here concerned he is having a heart attack.  He states he woke up on an hour prior to arrival with chest pressure that radiates to the right side.  He has had a before, lasting up to 20 minutes at a time occurring both in exertional nonexertional settings but it is never persisted as it has today.      States he is panting but does not necessarily feel short of breath.  Does not radiate to his back.  No diaphoresis.  No syncope or near syncope.    Objective:   Past Medical History:    Cancer (HCC)    right kidney cancer    Kidney stone on left side              Past Surgical History:   Procedure Laterality Date    Colonoscopy,biopsy  10/11/2010    Performed by VALARIE ASTORGA at [facility], [facility]    Knee arthroscp harv      Laparo radical nephrectomy  2004    Other surgical history       cysto, left rpg, left urs, left stent 6/15/13edw Dr Ashley    Other surgical history      Right knee arthroscopic meniscus repair                Social History     Socioeconomic History    Marital status:     Number of children: 2   Occupational History    Occupation: Contsruction   Tobacco Use    Smoking status: Never     Passive exposure: Never    Smokeless tobacco: Never   Vaping Use    Vaping status: Never Used   Substance and Sexual Activity    Alcohol use: Yes     Alcohol/week: 0.0 standard drinks of alcohol     Comment: SOCIAL/2 drinks per month    Drug use: No    Sexual activity: Yes     Partners: Female   Other Topics Concern    Sleep Concern No    Back Care No    Seat Belt Yes              Review of Systems    Positive for stated Chief Complaint: Chest Pain Angina    Other systems are as noted in HPI.  Constitutional and vital signs reviewed.      All other systems reviewed and negative except  as noted above.    Physical Exam     ED Triage Vitals   BP 09/17/24 0443 (!) 152/105   Pulse 09/17/24 0343 77   Resp 09/17/24 0343 20   Temp 09/17/24 0343 98.7 °F (37.1 °C)   Temp src 09/17/24 0343 Oral   SpO2 09/17/24 0343 98 %   O2 Device 09/17/24 0343 None (Room air)       Current Vitals:   Vital Signs  BP: (!) 160/97  Pulse: 60  Resp: 16  Temp: 98.7 °F (37.1 °C)  Temp src: Oral    Oxygen Therapy  SpO2: 99 %  O2 Device: None (Room air)            Physical Exam    Physical Exam   Constitutional: Awake, alert, well appearing  Head: Normocephalic and atraumatic.   Eyes: Conjunctivae are normal. Pupils are equal, round, and reactive to light.   Neck: Normal range of motion. No JVD  Cardiovascular: Normal rate, regular rhythm  Pulmonary/Chest: Normal effort.  No accessory muscle use.  No cyanosis.  Abdominal: Soft. Not distended.  Neurological: Pt is alert and oriented to person, place, and time. no cranial nerve deficits. Speech fluent      Lungs clear no murmurs    ED Course     Labs Reviewed   COMP METABOLIC PANEL (14) - Abnormal; Notable for the following components:       Result Value    Glucose 115 (*)     Creatinine 1.75 (*)     eGFR-Cr 42 (*)     All other components within normal limits   TROPONIN I HIGH SENSITIVITY - Abnormal; Notable for the following components:    Troponin I (High Sensitivity) 172 (*)     All other components within normal limits   TROPONIN I HIGH SENSITIVITY - Normal   PTT, ACTIVATED - Normal   CBC WITH DIFFERENTIAL WITH PLATELET   LIPID PANEL   RAINBOW DRAW BLUE     EKG    Rate, intervals and axes as noted on EKG Report.  Rate: 75  Rhythm: Sinus Rhythm  Reading: Subtle ST changes aVR V1 elevated J-point V2 V3, subtle changes in the inferior leads as well.  These were seen to a lesser degree on previous EKGs.                 Repeat EKG:  EKG    Rate, intervals and axes as noted on EKG Report.  Rate: 72  Rhythm: Sinus Rhythm  Reading: Unchanged from earlier      Per [facility],  normal chest x-ray with normal mediastinal contours          EKG    Rate, intervals and axes as noted on EKG Report.  Rate: 57  Rhythm: Sinus Rhythm  Reading: STEMI           MDM          Differential diagnoses considered: Unstable angina, pneumonia, pneumothorax, reflux all considered.  Acute aortic pathology also considered.    -Initial troponin is 37, will repeat here.  Pain not affected by nitro.  Will give morphine.    -Given concern for unstable angina persistent pain will admit for close observation, cardiology consultation and further diagnostic testing.      -Discussed potential aortic pathology with patient, discussed obtaining CT angiogram here this morning to rule out dissection.  Patient is understandably nervous about receiving contrast dye given he has CKD and 1 kidney.  His creatinine is elevated above his baseline. We discussed alternatives including hydration, repeating creatinine and then doing potentially doing the CTA.  JOVANNY is also consideration.  Patient would like to pursue one of the alternatives understanding potential consequences delayed diagnosis.    For now, IV fluids, blood pressure control.    Patient will be admitted primarily to the [facility] hospitalist.  Care has been discussed with the admitting physician.        0600  Repeat troponin elevated.  Repeat EKG not consistent with STEMI.  Discussed with Dr. Wise, will go emergently to the main hospital.    Updated admitting physician, report given to B pod physician Dr. Diez.       610 patient leaving now.   Patient remains hemodynamically stable.    I visualized the radiology studies, my independent interpretation:  no pneumonia pneumothorax noted on chest x-ray    *Discussion of ongoing management of this patient's care included: n/a  *Comorbidities contributing to the complexity of decision making: n/a  *External charts reviewed: -Recent cardiology notes reviewed, concern for unstable angina, CT angiogram coronary artery protocol  ordered does not seem like it has been completed yet.  Echocardiogram done yesterday duly was essentially normal.  -Cardiology notes in 2021 reviewed, had a gated CTA for a large aorta that measured at 4 cm.  *Additional sources of history: n/a    Shared decision making was done by: patient, myself.    Admission disposition: 9/17/2024  4:56 AM             A total of 45 minutes of critical care time (exclusive of billable procedures) was administered to manage the patient's cardiovascular instability due to his STEMI requiring emergent transportation to Cath Lab and heparin drip.  This involved direct patient intervention, complex decision making, and/or extensive discussions with the patient, family, and clinical staff.                           Medical Decision Making      Disposition and Plan     Clinical Impression:  1. Enlarged thoracic aorta (HCC)    2. ST elevation myocardial infarction (STEMI), unspecified artery (HCC)         Disposition:  Admit  9/17/2024  4:56 am    Follow-up:  No follow-up provider specified.        Medications Prescribed:  Current Discharge Medication List                            Hospital Problems       Present on Admission  Date Reviewed: 1/17/2022            ICD-10-CM Noted POA    * (Principal) Chest pain of uncertain etiology R07.9 9/17/2024 Unknown    Enlarged thoracic aorta (HCC) I77.89 9/17/2024 Unknown

## 2024-09-17 NOTE — ED QUICK NOTES
Report to Linda in cath lab. Per Linda, PT to bypass ER and go straight to cath lab.    Trilobed Flap Text: The defect edges were debeveled with a #15 scalpel blade.  Given the location of the defect and the proximity to free margins a trilobed flap was deemed most appropriate.  Using a sterile surgical marker, an appropriate trilobed flap drawn around the defect.    The area thus outlined was incised deep to adipose tissue with a #15 scalpel blade.  The skin margins were undermined to an appropriate distance in all directions utilizing iris scissors.

## 2024-09-17 NOTE — DIETARY NOTE
Clinical Nutrition     Dietitian consult received per cardiac rehab standing order. Pt to be educated by cardiac rehab staff and encouraged to attend outpatient classes taught by TERRELL. TERRELL available PRN.    Emely Rush RD, LDN, [facility]  Clinical Dietitian  Phone [phone]

## 2024-09-18 LAB
ANION GAP SERPL CALC-SCNC: 7 MMOL/L (ref 0–18)
ATRIAL RATE: 55 BPM
ATRIAL RATE: 57 BPM
ATRIAL RATE: 64 BPM
ATRIAL RATE: 70 BPM
ATRIAL RATE: 72 BPM
ATRIAL RATE: 75 BPM
BUN BLD-MCNC: 15 MG/DL (ref 9–23)
CALCIUM BLD-MCNC: 9.6 MG/DL (ref 8.7–10.4)
CHLORIDE SERPL-SCNC: 106 MMOL/L (ref 98–112)
CO2 SERPL-SCNC: 25 MMOL/L (ref 21–32)
CREAT BLD-MCNC: 1.34 MG/DL
EGFRCR SERPLBLD CKD-EPI 2021: 58 ML/MIN/1.73M2 (ref 60–?)
ERYTHROCYTE [DISTWIDTH] IN BLOOD BY AUTOMATED COUNT: 13.2 %
GLUCOSE BLD-MCNC: 105 MG/DL (ref 70–99)
HCT VFR BLD AUTO: 45.6 %
HGB BLD-MCNC: 15.9 G/DL
MCH RBC QN AUTO: 30.5 PG (ref 26–34)
MCHC RBC AUTO-ENTMCNC: 34.9 G/DL (ref 31–37)
MCV RBC AUTO: 87.5 FL
OSMOLALITY SERPL CALC.SUM OF ELEC: 287 MOSM/KG (ref 275–295)
P AXIS: -13 DEGREES
P AXIS: 15 DEGREES
P AXIS: 16 DEGREES
P AXIS: 43 DEGREES
P AXIS: 47 DEGREES
P AXIS: 48 DEGREES
P-R INTERVAL: 188 MS
P-R INTERVAL: 192 MS
P-R INTERVAL: 192 MS
P-R INTERVAL: 194 MS
P-R INTERVAL: 196 MS
P-R INTERVAL: 200 MS
PLATELET # BLD AUTO: 194 10(3)UL (ref 150–450)
POTASSIUM SERPL-SCNC: 4.2 MMOL/L (ref 3.5–5.1)
Q-T INTERVAL: 364 MS
Q-T INTERVAL: 374 MS
Q-T INTERVAL: 376 MS
Q-T INTERVAL: 384 MS
Q-T INTERVAL: 398 MS
Q-T INTERVAL: 428 MS
QRS DURATION: 86 MS
QRS DURATION: 86 MS
QRS DURATION: 88 MS
QRS DURATION: 92 MS
QRS DURATION: 94 MS
QRS DURATION: 94 MS
QTC CALCULATION (BEZET): 375 MS
QTC CALCULATION (BEZET): 387 MS
QTC CALCULATION (BEZET): 406 MS
QTC CALCULATION (BEZET): 409 MS
QTC CALCULATION (BEZET): 417 MS
QTC CALCULATION (BEZET): 420 MS
R AXIS: 106 DEGREES
R AXIS: 14 DEGREES
R AXIS: 17 DEGREES
R AXIS: 53 DEGREES
R AXIS: 6 DEGREES
R AXIS: 64 DEGREES
RBC # BLD AUTO: 5.21 X10(6)UL
SODIUM SERPL-SCNC: 138 MMOL/L (ref 136–145)
T AXIS: 13 DEGREES
T AXIS: 142 DEGREES
T AXIS: 37 DEGREES
T AXIS: 49 DEGREES
T AXIS: 54 DEGREES
T AXIS: 92 DEGREES
VENTRICULAR RATE: 55 BPM
VENTRICULAR RATE: 57 BPM
VENTRICULAR RATE: 64 BPM
VENTRICULAR RATE: 70 BPM
VENTRICULAR RATE: 72 BPM
VENTRICULAR RATE: 75 BPM
WBC # BLD AUTO: 9.6 X10(3) UL (ref 4–11)

## 2024-09-18 PROCEDURE — 85027 COMPLETE CBC AUTOMATED: CPT | Performed by: INTERNAL MEDICINE

## 2024-09-18 PROCEDURE — 80048 BASIC METABOLIC PNL TOTAL CA: CPT | Performed by: INTERNAL MEDICINE

## 2024-09-18 NOTE — PROGRESS NOTES
CC: follow-up hospital admission stemi    SUBJECTIVE:  Interval History:     Had nausea yesterday but now better  No pain   Ambulated last night      OBJECTIVE:  Scheduled Meds:    aspirin  162 mg Oral Once    ticagrelor  90 mg Oral Q12H    aspirin  81 mg Oral Daily    rosuvastatin  40 mg Oral Nightly    metoprolol tartrate  25 mg Oral 2x Daily(Beta Blocker)     Continuous Infusions:   PRN Meds:   albuterol    acetaminophen    ondansetron    prochlorperazine    melatonin    PHYSICAL EXAM  Vital signs: Temp:  [98.1 °F (36.7 °C)-98.9 °F (37.2 °C)] 98.8 °F (37.1 °C)  Pulse:  [56-80] 62  Resp:  [11-23] 20  BP: (108-161)/() 115/87  SpO2:  [96 %-100 %] 97 %      GENERAL - NAD, AAO  EYES- sclera anicteric,    HENT- normocephalic, OP - MMM  NECK - no JVD  CV- RRR  RESP - CTAB, normal resp effort  ABDOMEN- soft, NT/ND   EXT- no LE edema   PSYCH - normal mentation/ normal affect    Data Review:   Labs:   Recent Labs   Lab 09/17/24  0350 09/18/24  0451   WBC 8.6 9.6   HGB 16.9 15.9   MCV 84.3 87.5   .0 194.0       Recent Labs   Lab 09/17/24  0350 09/18/24  0451    138   K 3.9 4.2    106   CO2 26.0 25.0   BUN 23 15   CREATSERUM 1.75* 1.34*   CA 9.8 9.6   * 105*       Recent Labs   Lab 09/17/24  0350   ALT 29   AST 16   ALB 4.0       No results for input(s): \"PGLU\" in the last 168 hours.      FINDINGS:    Coronary angiography:    LMCA: The left main artery is normal.  LAD:  The left anterior descending artery is occluded proximally with thrombus, then severe proximal disease in a long lesion involving diagonals.  Then mid/distal 80% stenosis and diffuse mid/distal disease.  LCX: The left circumflex artery is mildly diseased vessel.   RCA:  The right coronary artery is dominant, large vessel.  95% distal stenosis with TIMI3 flow proximal to the crux.  Mild disease otherwise.      ASSESSMENT/PLAN:    Patient is a 68 year old male with PMH sig for CKD, solitary kidney here for chest pain      Impression     -chest pain  -STEMI, anterior     -ckd 3, baseline Cr ~1.5  -renal cancer sp nephrectomy     Plan     -sp angiogram, cath findings as above, ally to LAD, needs intervention to RCA as well  -dapt  Statin, LDL 98  IVFs  Daily bmp - cr stable /better         Will continue to follow while hospitalized. Please page me or the on-call hospitalist with questions or concerns.    Rolf Christianson Hospitalist  [phone]  Answering Service: [phone]

## 2024-09-18 NOTE — PROGRESS NOTES
[facility]  Progress Note    Billy Nielsen Patient Status:  Inpatient    1955 MRN [id number]   [place] [id number] Attending Davi Casper MD   Hosp Day # 1 PCP Carlton Li MD     Assessment:  1. Anterior STEMI (LAD occluded)  2. CAD (with residual 95% RCA proximal to crux)  3. CKD, Cr 1.35 today  4. Hx Renal CA  5. Anxiety post MI        Plan:  1. S/P primary PCI with stent to LAD (proximal mid/distal).  2. Med manage STEMI (BB, ARB as an outpatient given EF).  3. Med manage CAD (DAPT with Brilinta for now).  4. PCI to RCA tomorrow.  5. Consider DC tomorrow afternoon if RCA PCI uneventful.  6. IV hydration and spare contrast given Cr/CKD.  Hold ACEI/ARB given contrast tomorrow.  7. Reassurance that anxiety and depression are common post MI and improve with time and communities like cardiac rehab.  Can use anxiolytics as needed tonight/tomorrow.      Subjective:  No overnight events.  The patient denies any significant chest pain or dyspnea.  Having significant anxiety about diagnosis and future.  New symptom to him.    Objective:  /73   Pulse 58   Temp 97.9 °F (36.6 °C) (Temporal)   Resp 13   Ht 68\"   Wt 212 lb 15.4 oz (96.6 kg)   SpO2 97%   BMI 32.38 kg/m²   Temp (24hrs), Av.7 °F (37.1 °C), Min:97.9 °F (36.6 °C), Max:99.4 °F (37.4 °C)      Intake/Output Summary (Last 24 hours) at 2024 1750  Last data filed at 2024 1614  Gross per 24 hour   Intake --   Output 925 ml   Net -925 ml     Wt Readings from Last 3 Encounters:   24 212 lb 15.4 oz (96.6 kg)   21 213 lb (96.6 kg)   21 214 lb (97.1 kg)       General: Awake and alert; in no acute distress  Cardiac: Regular rate and regular rhythm; no murmurs/rubs/gallops are appreciated  Lungs: Clear to auscultation bilaterally; no accessory muscle use  Abdomen: Soft, non-tender; bowel sounds are normoactive  Extremities: No clubbing/cyanosis; moves all 4 extremities normally    Meds:     ticagrelor  90 mg Oral Q12H    aspirin  81 mg Oral Daily    rosuvastatin  40 mg Oral Nightly    metoprolol tartrate  25 mg Oral 2x Daily(Beta Blocker)       Laboratory Data:  Lab Results   Component Value Date    WBC 9.6 09/18/2024    HGB 15.9 09/18/2024    HCT 45.6 09/18/2024    .0 09/18/2024     No results found for: \"INR\"  Lab Results   Component Value Date     09/18/2024    K 4.2 09/18/2024     09/18/2024    CO2 25.0 09/18/2024    BUN 15 09/18/2024    CREATSERUM 1.34 09/18/2024     09/18/2024    CA 9.6 09/18/2024       Telemetry: No significant arrhythmia.      Thank you for allowing our practice to participate in the care of your patient. Please do not hesitate to contact me if you have any questions.    >35 min critical care time was spent on review, diagnosis, exam and planning of this complex post acute MI patient in the CCU.    Earle Zavala MD  9/18/2024

## 2024-09-18 NOTE — PLAN OF CARE
Assumed care of pt. At 1930. Pt. A & O x4. VSS - NSR. SHIELDS. R Groin C/D/I, soft. Palpable pedal pulses. Pt. C/o some nausea, relieved w/ PRN medication, see MAR. Pt. C/o anxiety throughout the night, MD notified, see MAR. Ambulating as tolerated. Will continue to monitor closely.

## 2024-09-19 ENCOUNTER — APPOINTMENT (OUTPATIENT)
Dept: INTERVENTIONAL RADIOLOGY/VASCULAR | Facility: HOSPITAL | Age: 69
End: 2024-09-19
Attending: INTERNAL MEDICINE
Payer: COMMERCIAL

## 2024-09-19 LAB
ANION GAP SERPL CALC-SCNC: 10 MMOL/L (ref 0–18)
BUN BLD-MCNC: 18 MG/DL (ref 9–23)
CALCIUM BLD-MCNC: 9.9 MG/DL (ref 8.7–10.4)
CHLORIDE SERPL-SCNC: 105 MMOL/L (ref 98–112)
CO2 SERPL-SCNC: 23 MMOL/L (ref 21–32)
CREAT BLD-MCNC: 1.44 MG/DL
EGFRCR SERPLBLD CKD-EPI 2021: 54 ML/MIN/1.73M2 (ref 60–?)
ERYTHROCYTE [DISTWIDTH] IN BLOOD BY AUTOMATED COUNT: 13.2 %
GLUCOSE BLD-MCNC: 87 MG/DL (ref 70–99)
HCT VFR BLD AUTO: 44.7 %
HGB BLD-MCNC: 15.9 G/DL
MAGNESIUM SERPL-MCNC: 1.9 MG/DL (ref 1.6–2.6)
MCH RBC QN AUTO: 30.6 PG (ref 26–34)
MCHC RBC AUTO-ENTMCNC: 35.6 G/DL (ref 31–37)
MCV RBC AUTO: 86 FL
OSMOLALITY SERPL CALC.SUM OF ELEC: 287 MOSM/KG (ref 275–295)
PLATELET # BLD AUTO: 179 10(3)UL (ref 150–450)
POTASSIUM SERPL-SCNC: 4.3 MMOL/L (ref 3.5–5.1)
RBC # BLD AUTO: 5.2 X10(6)UL
SODIUM SERPL-SCNC: 138 MMOL/L (ref 136–145)
WBC # BLD AUTO: 8.6 X10(3) UL (ref 4–11)

## 2024-09-19 PROCEDURE — 93005 ELECTROCARDIOGRAM TRACING: CPT

## 2024-09-19 PROCEDURE — 80048 BASIC METABOLIC PNL TOTAL CA: CPT | Performed by: HOSPITALIST

## 2024-09-19 PROCEDURE — 99211 OFF/OP EST MAY X REQ PHY/QHP: CPT

## 2024-09-19 PROCEDURE — 85027 COMPLETE CBC AUTOMATED: CPT | Performed by: HOSPITALIST

## 2024-09-19 PROCEDURE — 83735 ASSAY OF MAGNESIUM: CPT | Performed by: HOSPITALIST

## 2024-09-19 PROCEDURE — 027034Z DILATION OF CORONARY ARTERY, ONE ARTERY WITH DRUG-ELUTING INTRALUMINAL DEVICE, PERCUTANEOUS APPROACH: ICD-10-PCS | Performed by: INTERNAL MEDICINE

## 2024-09-19 PROCEDURE — 93010 ELECTROCARDIOGRAM REPORT: CPT | Performed by: INTERNAL MEDICINE

## 2024-09-19 RX ORDER — MIDAZOLAM HYDROCHLORIDE 1 MG/ML
INJECTION INTRAMUSCULAR; INTRAVENOUS
Status: COMPLETED
Start: 2024-09-19 | End: 2024-09-19

## 2024-09-19 RX ORDER — ASPIRIN 81 MG/1
81 TABLET ORAL DAILY
Status: DISCONTINUED | OUTPATIENT
Start: 2024-09-20 | End: 2024-09-20

## 2024-09-19 RX ORDER — NICARDIPINE HYDROCHLORIDE 2.5 MG/ML
INJECTION INTRAVENOUS
Status: COMPLETED
Start: 2024-09-19 | End: 2024-09-19

## 2024-09-19 RX ORDER — CLOPIDOGREL BISULFATE 75 MG/1
75 TABLET ORAL DAILY
Status: DISCONTINUED | OUTPATIENT
Start: 2024-09-20 | End: 2024-09-20

## 2024-09-19 RX ORDER — CLOPIDOGREL BISULFATE 75 MG/1
TABLET ORAL
Status: COMPLETED
Start: 2024-09-19 | End: 2024-09-19

## 2024-09-19 RX ORDER — NITROGLYCERIN 20 MG/100ML
INJECTION INTRAVENOUS
Status: COMPLETED
Start: 2024-09-19 | End: 2024-09-19

## 2024-09-19 RX ORDER — CLOPIDOGREL BISULFATE 75 MG/1
75 TABLET ORAL DAILY
Status: DISCONTINUED | OUTPATIENT
Start: 2024-09-19 | End: 2024-09-19

## 2024-09-19 RX ORDER — IODIXANOL 320 MG/ML
100 INJECTION, SOLUTION INTRAVASCULAR
Status: COMPLETED | OUTPATIENT
Start: 2024-09-19 | End: 2024-09-19

## 2024-09-19 RX ORDER — VERAPAMIL HYDROCHLORIDE 2.5 MG/ML
INJECTION, SOLUTION INTRAVENOUS
Status: COMPLETED
Start: 2024-09-19 | End: 2024-09-19

## 2024-09-19 RX ORDER — SODIUM CHLORIDE 9 MG/ML
INJECTION, SOLUTION INTRAVENOUS CONTINUOUS
Status: ACTIVE | OUTPATIENT
Start: 2024-09-19 | End: 2024-09-19

## 2024-09-19 RX ORDER — HEPARIN SODIUM 5000 [USP'U]/ML
INJECTION, SOLUTION INTRAVENOUS; SUBCUTANEOUS
Status: COMPLETED
Start: 2024-09-19 | End: 2024-09-19

## 2024-09-19 NOTE — PLAN OF CARE
Patient attempted to be seen this morning, however was already in Cath Lab, will attempt again later    Addi Vela D.O.  [facility] Hospitalist

## 2024-09-19 NOTE — DIETARY NOTE
Clinical Nutrition     Dietitian consult received per cardiac rehab standing order. Pt to be educated by cardiac rehab staff and encouraged to attend outpatient classes taught by TERRELL. TERRELL available PRN.    Emely Rush RD, LDN, [facility]  Clinical Dietitian  Phone [phone]

## 2024-09-19 NOTE — PROCEDURES
[facility]       Billy Nielsen Location: Cath Lab    CSN [id number] MRN [id number]   Admission Date 9/17/2024 Procedure Date 9/19/2024   Attending Physician Addi Vela,  Procedure Physician Earle Zavala MD         CARDIAC CATHETERIZATION/PERCUTANEOUS CORONARY INTERVENTION      PREOPERATIVE DIAGNOSIS:  STEMI, CAD  POSTOPERATIVE DIAGNOSIS:  CAD  PROCEDURE PERFORMED:  PCI to RCA with GIDEON      PROCEDURE:  The patient was brought to the cardiac catheterization lab in the fasting state.  Informed consent was obtained.  Moderate sedation was employed using 4mg IV Versed and 100mcg IV fentanyl.  I directly observed the patient from 1211 to 1237, watching the heart rate, blood pressure, oximetry, and rhythm.  An independent, trained observer was present throughout the procedure and assisted in the monitoring of the patient's level of consciousness and physiologic states.  Please see the [facility] Catheterization report in Epic for further technical details.     ACCESS/CATHETER PLACEMENT:  6F right radial, slender sheath.  Standard Slovenian and JAMES angiographic views with caudal and cranial angulation were used. A TR band was used for arterial hemostasis.     CORONARY INTERVENTION: JR4 guide.  Heparin.  BMW wire.  2.0 predil balloon.  3.5x15mm [place] GIDEON at 14ATM for 20s.  Excellent result.  IC cardene given.  Left main engaged with JL3.5 diagnostic.  2 shots to check.  Ended case.  Loaded plavix 300 (switch to plavix now).  TR band.     MEDICATIONS:  See nursing records, [facility] database, and EMR.     COMPLICATIONS:  None.     IMPRESSION:    STEMI, recent.  Primary PCI to LAD.  PCI to distal RCA today.  Switched from Brilinta to Plavix today.  CKD, Cr 1.4 today (75cc contrast used today)    RECOMMENDATIONS:   DAPT 1 year.  Would hydrate given Cr 1.4 and contrast used.  Probably best to hydrate overnight given 2 contrast loads in last few days.  Would probably watch overnight unless he's adamant about DC tonight at 6PM (would run  fluids for another 4+ hrs).  Needs cardiac rehab consult (which would be great before discharge).  Can transfer to telemetry at this  point.  Call me with questions.    Earle Zavala MD

## 2024-09-19 NOTE — PLAN OF CARE
Assumed care of the pt at approx. 1930 pm. A&O x4, follows commands. Per pt, his anxiety has improved today. Slept well throughout the night. Denies any chest pain. SR/SB on tele. VSS. R-groin soft, no hematoma. Dressing CDI. Pedal pulses palpable. Good UO. NPO at 0500 am per order. Pt updated on POC.

## 2024-09-19 NOTE — CARDIAC REHAB
Attempted to see patient for cardiac rehab education. Patient going to cath lab.    Will revisit for education

## 2024-09-19 NOTE — PLAN OF CARE
Pt hemodynamically stable. Ambulating halls. No reports of chest pain or shortness of breath. Pt voiding. Confirmed pt for procedure today, consent signed. Pt sent down to cath lab at 12pm.     1300 Pt returned from cath lab. TR band on right wrist. Pulse present above and below TR band with good capillary refill and sensation. Removing air cautiously, monitoring for signs of bleeding. See post interventional flowsheet for details on TR band removal. Pt reported mild chest pain post procedure. Dr. Wise notified, APN at bedside. EKG done. No additional interventions at this time. Continued to monitor symptoms for changes. Pt tolerating diet. Pt updated on plan of care.

## 2024-09-19 NOTE — CARDIAC REHAB
Cardiac rehab education completed with patient  Stent card given   Patient referred to cardiac rehab

## 2024-09-19 NOTE — PROGRESS NOTES
[facility] Cardiology  Progress Note    Billy Nielsen Patient Status:  Inpatient    1955 MRN [id number]   [place] [id number] Attending Davi Casper MD   Hosp Day # 2 PCP Carlton Li MD     Assessment:  1. Anterior STEMI (LAD occluded)  2. CAD (with residual 95% RCA proximal to crux)  3. CKD, Cr 1.35 today  4. Hx Renal CA  5. Anxiety post MI        Plan:  1. S/P primary PCI with stent to LAD (proximal mid/distal).  2. Med manage STEMI (BB, ARB as an outpatient given EF).  3. Med manage CAD (DAPT with Brilinta for now).  4. PCI to RCA today.  5. IV hydration and spare contrast given Cr/CKD.  Hold ACEI/ARB given contrast tomorrow.  6. Anxiety improved.  7. Plug into cardiac rehab.      Subjective:  No overnight events.  The patient denies any significant chest pain or dyspnea.  Having significant anxiety about diagnosis and future.  New symptom to him.    Objective:  /85   Pulse 70   Temp 98.2 °F (36.8 °C) (Temporal)   Resp 14   Ht 68\"   Wt 212 lb 15.4 oz (96.6 kg)   SpO2 94%   BMI 32.38 kg/m²   Temp (24hrs), Av.2 °F (36.8 °C), Min:97.1 °F (36.2 °C), Max:99.5 °F (37.5 °C)      Intake/Output Summary (Last 24 hours) at 2024 1247  Last data filed at 2024 1130  Gross per 24 hour   Intake 1100 ml   Output 1525 ml   Net -425 ml     Wt Readings from Last 3 Encounters:   24 212 lb 15.4 oz (96.6 kg)   21 213 lb (96.6 kg)   21 214 lb (97.1 kg)       General: Awake and alert; in no acute distress  Cardiac: Regular rate and regular rhythm; no murmurs/rubs/gallops are appreciated  Lungs: Clear to auscultation bilaterally; no accessory muscle use  Abdomen: Soft, non-tender; bowel sounds are normoactive  Extremities: No clubbing/cyanosis; moves all 4 extremities normally    Meds:    clopidogrel  75 mg Oral Daily    aspirin  81 mg Oral Daily    rosuvastatin  40 mg Oral Nightly    metoprolol tartrate  25 mg Oral 2x Daily(Beta Blocker)       Laboratory  Data:  Lab Results   Component Value Date    WBC 8.6 09/19/2024    HGB 15.9 09/19/2024    HCT 44.7 09/19/2024    .0 09/19/2024     No results found for: \"INR\"  Lab Results   Component Value Date     09/19/2024    K 4.3 09/19/2024     09/19/2024    CO2 23.0 09/19/2024    BUN 18 09/19/2024    CREATSERUM 1.44 09/19/2024    GLU 87 09/19/2024    CA 9.9 09/19/2024    MG 1.9 09/19/2024       Telemetry: No significant arrhythmia.      Thank you for allowing our practice to participate in the care of your patient. Please do not hesitate to contact me if you have any questions.    >35 min critical care time was spent on review, diagnosis, exam and planning of this complex post acute MI patient in the CCU.    Earle Zavala MD  9/18/2024

## 2024-09-20 VITALS
OXYGEN SATURATION: 95 % | WEIGHT: 201.94 LBS | BODY MASS INDEX: 30.61 KG/M2 | HEIGHT: 68 IN | RESPIRATION RATE: 17 BRPM | HEART RATE: 70 BPM | SYSTOLIC BLOOD PRESSURE: 129 MMHG | TEMPERATURE: 98 F | DIASTOLIC BLOOD PRESSURE: 77 MMHG

## 2024-09-20 PROBLEM — R07.9 CHEST PAIN OF UNCERTAIN ETIOLOGY: Status: RESOLVED | Noted: 2024-09-17 | Resolved: 2024-09-20

## 2024-09-20 PROBLEM — I25.119 CORONARY ARTERY DISEASE INVOLVING NATIVE CORONARY ARTERY OF NATIVE HEART WITH ANGINA PECTORIS (HCC): Status: ACTIVE | Noted: 2024-09-20

## 2024-09-20 PROBLEM — I25.119 CORONARY ARTERY DISEASE INVOLVING NATIVE CORONARY ARTERY OF NATIVE HEART WITH ANGINA PECTORIS: Status: ACTIVE | Noted: 2024-09-20

## 2024-09-20 LAB
ANION GAP SERPL CALC-SCNC: 7 MMOL/L (ref 0–18)
ATRIAL RATE: 71 BPM
BUN BLD-MCNC: 19 MG/DL (ref 9–23)
CALCIUM BLD-MCNC: 9.5 MG/DL (ref 8.7–10.4)
CHLORIDE SERPL-SCNC: 107 MMOL/L (ref 98–112)
CO2 SERPL-SCNC: 22 MMOL/L (ref 21–32)
CREAT BLD-MCNC: 1.41 MG/DL
EGFRCR SERPLBLD CKD-EPI 2021: 55 ML/MIN/1.73M2 (ref 60–?)
ERYTHROCYTE [DISTWIDTH] IN BLOOD BY AUTOMATED COUNT: 13.1 %
GLUCOSE BLD-MCNC: 85 MG/DL (ref 70–99)
HCT VFR BLD AUTO: 42.7 %
HGB BLD-MCNC: 15.5 G/DL
MAGNESIUM SERPL-MCNC: 1.7 MG/DL (ref 1.6–2.6)
MCH RBC QN AUTO: 30.6 PG (ref 26–34)
MCHC RBC AUTO-ENTMCNC: 36.3 G/DL (ref 31–37)
MCV RBC AUTO: 84.2 FL
OSMOLALITY SERPL CALC.SUM OF ELEC: 284 MOSM/KG (ref 275–295)
P AXIS: 58 DEGREES
P-R INTERVAL: 186 MS
PLATELET # BLD AUTO: 173 10(3)UL (ref 150–450)
POTASSIUM SERPL-SCNC: 4 MMOL/L (ref 3.5–5.1)
Q-T INTERVAL: 420 MS
QRS DURATION: 96 MS
QTC CALCULATION (BEZET): 456 MS
R AXIS: 86 DEGREES
RBC # BLD AUTO: 5.07 X10(6)UL
SODIUM SERPL-SCNC: 136 MMOL/L (ref 136–145)
T AXIS: 110 DEGREES
VENTRICULAR RATE: 71 BPM
WBC # BLD AUTO: 6.8 X10(3) UL (ref 4–11)

## 2024-09-20 PROCEDURE — 80048 BASIC METABOLIC PNL TOTAL CA: CPT | Performed by: HOSPITALIST

## 2024-09-20 PROCEDURE — 85027 COMPLETE CBC AUTOMATED: CPT | Performed by: HOSPITALIST

## 2024-09-20 PROCEDURE — 83735 ASSAY OF MAGNESIUM: CPT | Performed by: HOSPITALIST

## 2024-09-20 PROCEDURE — 99211 OFF/OP EST MAY X REQ PHY/QHP: CPT

## 2024-09-20 RX ORDER — METOPROLOL TARTRATE 25 MG/1
25 TABLET, FILM COATED ORAL
Qty: 60 TABLET | Refills: 3 | Status: SHIPPED | OUTPATIENT
Start: 2024-09-20

## 2024-09-20 RX ORDER — ASPIRIN 81 MG/1
81 TABLET ORAL DAILY
Qty: 30 TABLET | Refills: 0 | Status: SHIPPED | OUTPATIENT
Start: 2024-09-21 | End: 2024-10-21

## 2024-09-20 RX ORDER — LORAZEPAM 2 MG/ML
0.5 INJECTION INTRAMUSCULAR ONCE
Status: COMPLETED | OUTPATIENT
Start: 2024-09-20 | End: 2024-09-20

## 2024-09-20 RX ORDER — CLOPIDOGREL BISULFATE 75 MG/1
75 TABLET ORAL DAILY
Qty: 30 TABLET | Refills: 0 | Status: SHIPPED | OUTPATIENT
Start: 2024-09-21 | End: 2024-10-21

## 2024-09-20 RX ORDER — MAGNESIUM OXIDE 400 MG/1
400 TABLET ORAL ONCE
Status: COMPLETED | OUTPATIENT
Start: 2024-09-20 | End: 2024-09-20

## 2024-09-20 RX ORDER — ROSUVASTATIN CALCIUM 40 MG/1
40 TABLET, COATED ORAL NIGHTLY
Qty: 30 TABLET | Refills: 0 | Status: SHIPPED | OUTPATIENT
Start: 2024-09-20 | End: 2024-10-20

## 2024-09-20 RX ORDER — LOSARTAN POTASSIUM 25 MG/1
25 TABLET ORAL DAILY
Qty: 30 TABLET | Refills: 3 | Status: SHIPPED | OUTPATIENT
Start: 2024-09-20 | End: 2024-09-20

## 2024-09-20 NOTE — CARDIAC REHAB
CAD education completed.  Stent card given.  Pt to call if he decides to enroll in cardiac rehab.

## 2024-09-20 NOTE — PLAN OF CARE
Assumed care of patient at 1930. Pt is AOx4, following commands, and SHIELDS. Denies SOB, nausea, dizziness, or pain. R radial TB band in place and removed per orders. Up to the bathroom with standby assist. POC discussed with pt. For complete assessment see E charting.

## 2024-09-20 NOTE — DISCHARGE INSTRUCTIONS
Mr. Nielsen,    Thank you for allowing us to take care of your health during your admission at [facility]. You are stable for discharge at this time. Your diagnoses and specific instructions for your medications are listed below. Please ensure you follow up with your PCP in 1-2 weeks on discharge and all other follow up appointments listed below.    Diagnoses: Coronary Artery Disease, Myocardial Infarction, Heart Failure w/ Mildly Reduced Ejection Fraction    Changes to Medications:  - Start Aspirin 81 mg daily and Plavix 75mg daily for atleast 1 year as recommended by your Cardiologist, this keeps your stents open  - Crestor 40mg nightly  - For your Heart Failure:  -> Start Metoprolol     Important Results:  - You had blockages in 2 of the arteries of your heart  - Your ECHOcardiogram showed slightly decreased function because of the blockages ( this can recover with proper diet/exercise/medication adherance)    Follow Ups:  - PCP in 1-2 weeks  - Cardiology in 2-3 weeks  - Cardiac Rehab    Best Wishes and Good Addi Osborne D.O.  [facility]

## 2024-09-20 NOTE — PLAN OF CARE
Pt hemodynamically stable.  Pt without CP, SOB, dizziness or palpitations.  Pt ambulating halls without difficulty or symptoms.  Right radial femoral sites WNL, pulses present.    Pt with discharge orders.  Reviewed discharge instructions, medications/prescriptions and follow up appointments with pt.  Tele monitor and PIV's removed.  Pt escorted to front entrance via WC into care of family.

## 2024-09-20 NOTE — DISCHARGE SUMMARY
[facility] Internal Medicine Hospitalist Discharge Summary     Patient ID:  Billy Nielsen  68 year old  12/4/1955    Admit date: 9/17/2024    Discharge date: 9/20/24    Attending Physician: Addi Vela DO     Primary Care Physician: Carlton Li MD     Discharge Diagnoses: ICM, CAD, MI, HFmrEF    Discharge Condition: stable    Disposition:  Home    Important Follow Ups:  - PCP: 1-2 weeks  - Consults: Cardiology in 2-3 weeks    Hospital Course:      67 y/o M w/ PMHx of CKD2-3, Solitary Kidney who presents for CP. Found to have STEMI. New CAD.    ICM (new)  CAD (new)  S/p PCI to LAD and now RCA  ECHO: 40-45%, G1DD, akinesis of the mid anterior/basal/mid anteroseptal walls, ascending aorta 4.2 cm  Plan:  - DAPT for 1 year  - BB on discharge (would recommend Toprol/Coreg, but will defer to Cards), rest of GDMT to be slowly started as outpatient, due to normotension in house. Jardiance recommended as well as outpatient  - Cardiac rehab saw patient prior to discharge  - Crestor 40 nightly  - Cards follow up in 2-3 weeks    Consults: IP CONSULT TO HOSPITALIST  IP CONSULT TO CARDIOLOGY  IP CONSULT TO CARDIOLOGY  IP CONSULT TO HOSPITALIST  IP CONSULT TO CARDIAC REHAB  IP CONSULT TO FOOD AND NUTRITION SERVICES  IP CONSULT TO CARDIAC REHAB  IP CONSULT TO FOOD AND NUTRITION SERVICES    Operative Procedures:        Patient instructions:      I as the attending physician reconciled the current and discharge medications on day of discharge.     Current Discharge Medication List        START taking these medications    Details   aspirin 81 MG Oral Tab EC Take 1 tablet (81 mg total) by mouth daily.      clopidogrel 75 MG Oral Tab Take 1 tablet (75 mg total) by mouth daily.      rosuvastatin 40 MG Oral Tab Take 1 tablet (40 mg total) by mouth nightly.           CONTINUE these medications which have NOT CHANGED    Details   albuterol (PROAIR HFA) 108 (90 Base) MCG/ACT  Inhalation Aero Soln Inhale 2 puffs into the lungs 4 (four) times daily.             Activity: activity as tolerated  Diet: cardiac diet  Wound Care: as directed  Code Status: No Order      Exam on day of discharge:     Vitals:    09/20/24 1100   BP: 115/83   Pulse: 66   Resp: 13   Temp:        General: no acute distress, AAOx3  Heart: regular rate and rhythm  Lungs: clear bilaterally, no active wheezing  Abdomen: nontender, nondistended  Extremities: no pedal edema   Neuro: no focal deficits      Total time coordinating care for discharge: Greater than 30 minutes    Addi Vela D.O.  [facility]

## 2024-09-20 NOTE — PROGRESS NOTES
[facility] Card[facility]  Progress Note    Billy Nielsen Patient Status:  Inpatient    1955 MRN [id number]   [place] [id number] Attending Davi Casper MD   Hosp Day # 3 PCP Carlton Li MD     Assessment:  1. Anterior STEMI (LAD occluded)  2. CAD (s/p PCI to RCA)  3. CKD, Cr 1.4 today  4. Hx Renal CA  5. Anxiety post MI        Plan:  1. S/P primary PCI with stent to LAD (proximal mid/distal).  2. Med manage STEMI (BB, ARB as an outpatient given EF).  3. Med manage CAD (DAPT with Plavix).  4. Cr stable.  Consider ARB as outpatient (Cr 1.4 but as high as 1.8 at admission will need close monitoring so would wait until next week)  5. FU with cardiac rehab.  6. FU with JENIFER Currie or me in 1-2 weeks for post MI medical management.    Ok to DC home on current meds.  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      Subjective:  No overnight events.  Ambulating, no issues.  No more CP.  Wants to go home.    Objective:  /85 (BP Location: Left arm)   Pulse 67   Temp 98.3 °F (36.8 °C) (Temporal)   Resp 13   Ht 68\"   Wt 201 lb 15.1 oz (91.6 kg)   SpO2 97%   BMI 30.71 kg/m²   Temp (24hrs), Av.8 °F (37.1 °C), Min:98.3 °F (36.8 °C), Max:99.6 °F (37.6 °C)      Intake/Output Summary (Last 24 hours) at 2024 1232  Last data filed at 2024 0500  Gross per 24 hour   Intake 1107 ml   Output 475 ml   Net 632 ml     Wt Readings from Last 3 Encounters:   24 201 lb 15.1 oz (91.6 kg)   21 213 lb (96.6 kg)   21 214 lb (97.1 kg)       General: Awake and alert; in no acute distress  Cardiac: Regular rate and regular rhythm; no murmurs/rubs/gallops are appreciated  Lungs: Clear to auscultation bilaterally; no accessory muscle use  Abdomen: Soft, non-tender; bowel sounds are normoactive  Extremities: No clubbing/cyanosis; moves all 4 extremities normally    Meds:    clopidogrel  75 mg Oral Daily    aspirin  81 mg Oral  Daily    rosuvastatin  40 mg Oral Nightly    metoprolol tartrate  25 mg Oral 2x Daily(Beta Blocker)       Laboratory Data:  Lab Results   Component Value Date    WBC 6.8 09/20/2024    HGB 15.5 09/20/2024    HCT 42.7 09/20/2024    .0 09/20/2024     No results found for: \"INR\"  Lab Results   Component Value Date     09/20/2024    K 4.0 09/20/2024     09/20/2024    CO2 22.0 09/20/2024    BUN 19 09/20/2024    CREATSERUM 1.41 09/20/2024    GLU 85 09/20/2024    CA 9.5 09/20/2024    MG 1.7 09/20/2024       Telemetry: No significant arrhythmia.    Thank you for allowing our practice to participate in the care of your patient. Please do not hesitate to contact me if you have any questions.    Earle Zavala MD

## 2024-09-22 LAB
ATRIAL RATE: 74 BPM
P AXIS: 36 DEGREES
P-R INTERVAL: 178 MS
Q-T INTERVAL: 368 MS
QRS DURATION: 94 MS
QTC CALCULATION (BEZET): 408 MS
R AXIS: 46 DEGREES
T AXIS: 71 DEGREES
VENTRICULAR RATE: 74 BPM

## 2024-09-25 LAB — ISTAT ACTIVATED CLOTTING TIME: 299 SECONDS (ref 74–137)

## 2024-10-01 ENCOUNTER — HOSPITAL ENCOUNTER (OUTPATIENT)
Facility: HOSPITAL | Age: 69
Setting detail: OBSERVATION
Discharge: HOME | End: 2024-10-03
Attending: EMERGENCY MEDICINE | Admitting: HOSPITALIST
Payer: COMMERCIAL

## 2024-10-01 ENCOUNTER — APPOINTMENT (OUTPATIENT)
Dept: GENERAL RADIOLOGY | Age: 69
End: 2024-10-01
Attending: EMERGENCY MEDICINE
Payer: COMMERCIAL

## 2024-10-01 DIAGNOSIS — R79.89 ELEVATED TROPONIN: ICD-10-CM

## 2024-10-01 DIAGNOSIS — Z95.5 STATUS POST INSERTION OF DRUG-ELUTING STENT INTO LEFT ANTERIOR DESCENDING (LAD) ARTERY FOR CORONARY ARTERY DISEASE: ICD-10-CM

## 2024-10-01 DIAGNOSIS — F41.9 ANXIETY DISORDER, UNSPECIFIED TYPE: ICD-10-CM

## 2024-10-01 DIAGNOSIS — R06.00 DYSPNEA, UNSPECIFIED TYPE: Primary | ICD-10-CM

## 2024-10-01 LAB
ALBUMIN SERPL-MCNC: 4 G/DL (ref 3.4–5)
ALBUMIN/GLOB SERPL: 1.3 {RATIO} (ref 1–2)
ALP LIVER SERPL-CCNC: 63 U/L
ALT SERPL-CCNC: 28 U/L
ANION GAP SERPL CALC-SCNC: 3 MMOL/L (ref 0–18)
AST SERPL-CCNC: 17 U/L (ref 15–37)
BASOPHILS # BLD AUTO: 0.06 X10(3) UL (ref 0–0.2)
BASOPHILS NFR BLD AUTO: 0.8 %
BILIRUB SERPL-MCNC: 0.7 MG/DL (ref 0.1–2)
BUN BLD-MCNC: 20 MG/DL (ref 9–23)
CALCIUM BLD-MCNC: 9.7 MG/DL (ref 8.5–10.1)
CHLORIDE SERPL-SCNC: 106 MMOL/L (ref 98–112)
CHOLEST SERPL-MCNC: 112 MG/DL (ref ?–200)
CO2 SERPL-SCNC: 27 MMOL/L (ref 21–32)
CREAT BLD-MCNC: 1.56 MG/DL
EGFRCR SERPLBLD CKD-EPI 2021: 49 ML/MIN/1.73M2 (ref 60–?)
EOSINOPHIL # BLD AUTO: 0.19 X10(3) UL (ref 0–0.7)
EOSINOPHIL NFR BLD AUTO: 2.6 %
ERYTHROCYTE [DISTWIDTH] IN BLOOD BY AUTOMATED COUNT: 12.9 %
GLOBULIN PLAS-MCNC: 3.2 G/DL (ref 2.8–4.4)
GLUCOSE BLD-MCNC: 98 MG/DL (ref 70–99)
HCT VFR BLD AUTO: 45 %
HDLC SERPL-MCNC: 40 MG/DL (ref 40–59)
HGB BLD-MCNC: 16.2 G/DL
IMM GRANULOCYTES # BLD AUTO: 0.03 X10(3) UL (ref 0–1)
IMM GRANULOCYTES NFR BLD: 0.4 %
LDLC SERPL CALC-MCNC: 55 MG/DL (ref ?–100)
LYMPHOCYTES # BLD AUTO: 1.39 X10(3) UL (ref 1–4)
LYMPHOCYTES NFR BLD AUTO: 19 %
MCH RBC QN AUTO: 30.6 PG (ref 26–34)
MCHC RBC AUTO-ENTMCNC: 36 G/DL (ref 31–37)
MCV RBC AUTO: 84.9 FL
MONOCYTES # BLD AUTO: 0.44 X10(3) UL (ref 0.1–1)
MONOCYTES NFR BLD AUTO: 6 %
NEUTROPHILS # BLD AUTO: 5.19 X10 (3) UL (ref 1.5–7.7)
NEUTROPHILS # BLD AUTO: 5.19 X10(3) UL (ref 1.5–7.7)
NEUTROPHILS NFR BLD AUTO: 71.2 %
NONHDLC SERPL-MCNC: 72 MG/DL (ref ?–130)
NT-PROBNP SERPL-MCNC: 1688 PG/ML (ref ?–125)
OSMOLALITY SERPL CALC.SUM OF ELEC: 285 MOSM/KG (ref 275–295)
PLATELET # BLD AUTO: 236 10(3)UL (ref 150–450)
POTASSIUM SERPL-SCNC: 4.6 MMOL/L (ref 3.5–5.1)
PROT SERPL-MCNC: 7.2 G/DL (ref 6.4–8.2)
RBC # BLD AUTO: 5.3 X10(6)UL
SODIUM SERPL-SCNC: 136 MMOL/L (ref 136–145)
TRIGL SERPL-MCNC: 88 MG/DL (ref 30–149)
TROPONIN I SERPL HS-MCNC: 110 NG/L
TROPONIN I SERPL HS-MCNC: 123 NG/L
TROPONIN I SERPL HS-MCNC: 129 NG/L
TROPONIN I SERPL HS-MCNC: 140 NG/L
VLDLC SERPL CALC-MCNC: 13 MG/DL (ref 0–30)
WBC # BLD AUTO: 7.3 X10(3) UL (ref 4–11)

## 2024-10-01 PROCEDURE — 71045 X-RAY EXAM CHEST 1 VIEW: CPT | Performed by: EMERGENCY MEDICINE

## 2024-10-01 RX ORDER — ASPIRIN 81 MG/1
81 TABLET ORAL DAILY
Status: DISCONTINUED | OUTPATIENT
Start: 2024-10-02 | End: 2024-10-03

## 2024-10-01 RX ORDER — ALBUTEROL SULFATE 90 UG/1
2 INHALANT RESPIRATORY (INHALATION) EVERY 6 HOURS PRN
Status: DISCONTINUED | OUTPATIENT
Start: 2024-10-01 | End: 2024-10-03

## 2024-10-01 RX ORDER — FUROSEMIDE 10 MG/ML
40 INJECTION INTRAMUSCULAR; INTRAVENOUS ONCE
Status: COMPLETED | OUTPATIENT
Start: 2024-10-01 | End: 2024-10-01

## 2024-10-01 RX ORDER — ACETAMINOPHEN 500 MG
500 TABLET ORAL EVERY 4 HOURS PRN
Status: DISCONTINUED | OUTPATIENT
Start: 2024-10-01 | End: 2024-10-03

## 2024-10-01 RX ORDER — SENNOSIDES 8.6 MG
17.2 TABLET ORAL NIGHTLY PRN
Status: DISCONTINUED | OUTPATIENT
Start: 2024-10-01 | End: 2024-10-03

## 2024-10-01 RX ORDER — TRAZODONE HYDROCHLORIDE 50 MG/1
50 TABLET, FILM COATED ORAL NIGHTLY
Status: DISCONTINUED | OUTPATIENT
Start: 2024-10-01 | End: 2024-10-02

## 2024-10-01 RX ORDER — CLOPIDOGREL BISULFATE 75 MG/1
75 TABLET ORAL DAILY
Status: DISCONTINUED | OUTPATIENT
Start: 2024-10-02 | End: 2024-10-03

## 2024-10-01 RX ORDER — BISACODYL 10 MG
10 SUPPOSITORY, RECTAL RECTAL
Status: DISCONTINUED | OUTPATIENT
Start: 2024-10-01 | End: 2024-10-03

## 2024-10-01 RX ORDER — ONDANSETRON 2 MG/ML
4 INJECTION INTRAMUSCULAR; INTRAVENOUS EVERY 6 HOURS PRN
Status: DISCONTINUED | OUTPATIENT
Start: 2024-10-01 | End: 2024-10-03

## 2024-10-01 RX ORDER — FUROSEMIDE 10 MG/ML
20 INJECTION INTRAMUSCULAR; INTRAVENOUS ONCE
Status: DISCONTINUED | OUTPATIENT
Start: 2024-10-01 | End: 2024-10-01

## 2024-10-01 RX ORDER — METOPROLOL TARTRATE 25 MG/1
25 TABLET, FILM COATED ORAL
Status: DISCONTINUED | OUTPATIENT
Start: 2024-10-01 | End: 2024-10-03

## 2024-10-01 RX ORDER — SODIUM PHOSPHATE, DIBASIC AND SODIUM PHOSPHATE, MONOBASIC 7; 19 G/230ML; G/230ML
1 ENEMA RECTAL ONCE AS NEEDED
Status: DISCONTINUED | OUTPATIENT
Start: 2024-10-01 | End: 2024-10-03

## 2024-10-01 RX ORDER — HEPARIN SODIUM 5000 [USP'U]/ML
5000 INJECTION, SOLUTION INTRAVENOUS; SUBCUTANEOUS EVERY 8 HOURS SCHEDULED
Status: DISCONTINUED | OUTPATIENT
Start: 2024-10-01 | End: 2024-10-03

## 2024-10-01 RX ORDER — METOCLOPRAMIDE HYDROCHLORIDE 5 MG/ML
5 INJECTION INTRAMUSCULAR; INTRAVENOUS EVERY 8 HOURS PRN
Status: DISCONTINUED | OUTPATIENT
Start: 2024-10-01 | End: 2024-10-03

## 2024-10-01 RX ORDER — ROSUVASTATIN CALCIUM 20 MG/1
40 TABLET, COATED ORAL NIGHTLY
Status: DISCONTINUED | OUTPATIENT
Start: 2024-10-01 | End: 2024-10-03

## 2024-10-01 RX ORDER — SODIUM CHLORIDE 9 MG/ML
INJECTION, SOLUTION INTRAVENOUS
Status: ACTIVE | OUTPATIENT
Start: 2024-10-02 | End: 2024-10-02

## 2024-10-01 RX ORDER — POLYETHYLENE GLYCOL 3350 17 G/17G
17 POWDER, FOR SOLUTION ORAL DAILY PRN
Status: DISCONTINUED | OUTPATIENT
Start: 2024-10-01 | End: 2024-10-03

## 2024-10-01 NOTE — ED QUICK NOTES
Orders for admission, patient is aware of plan and ready to go upstairs. Any questions, please call ED THAI Keys at extension [phone].     Patient Covid vaccination status: Fully vaccinated     COVID Test Ordered in ED: None    COVID Suspicion at Admission: N/A    Running Infusions:  None    Mental Status/LOC at time of transport: alert/oriented x4/4    Other pertinent information:   CIWA score: N/A   NIH score:  N/A

## 2024-10-01 NOTE — H&P
.CC:   Chief Complaint   Patient presents with    Anxiety/Panic attack    Chest Pain Angina        PCP: Carlton Li MD    History of Present Illness: Patient is a 68 year old male with PMH sig for ckd/h/o nephrectomy, recent STEMI with PCI to LAD that was occluded and then had staged PCI to RCA 2 days later, recent HFref 40-45% who presented with sob, dyspnea/orthopnea.  Patient reports subjective sensation of feeling like he cannot take a full breath or that it is shallow.  He felt like he could control it and calm himself before but not today. He reported that he felt like he was climbing out of his skin and hot/cold and could not sit still. He does note that when trying to lay down he feels more concern with his breathing, today received IV lasix and this seemed to help at first. He had started discussing some of his anxiety and breathing symptoms with his pcp after last hospitalization and was advised to start taking trazodone at night for sleep. He had received ativan a couple of times in hospital here before. He also reported that food doesn't taste the same and he has been eating less.       PMH  Past Medical History:    Anxiety state    Cancer (HCC)    right kidney cancer    Heart attack (HCC)    Kidney stone on left side        PSH  Past Surgical History:   Procedure Laterality Date    Cath drug eluting stent      Colonoscopy,biopsy  10/11/2010    Performed by VALARIE ASTORGA at [facility] [place], [facility]    Knee arthroscp harv      Laparo radical nephrectomy  01/01/2004    Other accessory      CARDIAC STENT PLACEMENT X3    Other surgical history       cysto, left rpg, left urs, left stent 6/15/13edw Dr Ashley    Other surgical history      Right knee arthroscopic meniscus repair        ALL:  Allergies   Allergen Reactions    Nsaids OTHER (SEE COMMENTS)     Due to one kidney recommended not to take    Septra [Sulfamethoxazole W/Trimethoprim, Co-Trimoxazole] RASH and FEVER     Rash like a sunburn and temp         Home Medications:  Outpatient Medications Marked as Taking for the 10/1/24 encounter (Hospital Encounter)   Medication Sig Dispense Refill    aspirin 81 MG Oral Tab EC Take 1 tablet (81 mg total) by mouth daily. 30 tablet 0    clopidogrel 75 MG Oral Tab Take 1 tablet (75 mg total) by mouth daily. 30 tablet 0    rosuvastatin 40 MG Oral Tab Take 1 tablet (40 mg total) by mouth nightly. 30 tablet 0    metoprolol tartrate 25 MG Oral Tab Take 1 tablet (25 mg total) by mouth 2x Daily(Beta Blocker). 60 tablet 3         Soc Hx  Social History     Tobacco Use    Smoking status: Never     Passive exposure: Never    Smokeless tobacco: Never   Substance Use Topics    Alcohol use: Yes     Alcohol/week: 0.0 standard drinks of alcohol     Comment: SOCIAL/2 drinks per month        Fam Hx  Family History   Problem Relation Age of Onset    Cancer Father     Diabetes Mother     Other (Other) Mother        Review of Systems  Comprehensive ROS reviewed and negative except for what's stated above.       OBJECTIVE:  /85 (BP Location: Right arm)   Pulse 88   Temp 98.4 °F (36.9 °C) (Oral)   Resp 17   Ht 5' 9\" (1.753 m)   Wt 197 lb 12.8 oz (89.7 kg)   SpO2 98%   BMI 29.21 kg/m²     Gen- NAD, appears stated age  HEENT- NCAT, anicteric sclera, MMM, OP clear  Lymph- no cervical LAD  CV- RRR no murmurs. No YVONNE  Lungs- CTAB, good respiratory effort  Abd- soft, ntnd, no organomegaly, BS+  Derm- no rashes  MSK- good muscle strength and tone, no joint swelling  Neuro- A&OX3, no focal deficits      Diagnostic Data:    CBC/Chem  Recent Labs   Lab 10/01/24  1118   WBC 7.3   HGB 16.2   MCV 84.9   .0       Recent Labs   Lab 10/01/24  1118      K 4.6      CO2 27.0   BUN 20   CREATSERUM 1.56*   GLU 98   CA 9.7       Recent Labs   Lab 10/01/24  1118   ALT 28   AST 17   ALB 4.0       No results for input(s): \"TROP\" in the last 168 hours.    Additional Diagnostics: personally reviewed EKG- nsr, no st/t abn    CXR: image  personally reviewed- clear    Radiology: XR CHEST AP PORTABLE  (CPT=71045)    Result Date: 10/1/2024  PROCEDURE:  XR CHEST AP PORTABLE  (CPT=71045)  TECHNIQUE:  AP chest radiograph was obtained.  COMPARISON:  [facility], XR, XR CHEST AP PORTABLE  (CPT=71045), 2024, 4:23 AM.  INDICATIONS:  anxiety attack  PATIENT STATED HISTORY: (As transcribed by Technologist)  Patient states that he had an anxiety attack. Patient denies any pain, cough, or shortness of breath. Patient had 3 cardiac stents placed 2 weeks ago.    FINDINGS:  Lungs and pleural spaces are clear.  Cardiac size is within normal limits.  Mediastinum and micheal are unremarkable.  Chest wall structures are unremarkable.            CONCLUSION:  There is no evidence of active cardiopulmonary disease on this single portable chest radiograph.   LOCATION:  [facility]      Dictated by (CST): Dale Rodriguez MD on 10/01/2024 at 12:10 PM     Finalized by (CST): Dale Rodriguez MD on 10/01/2024 at 12:10 PM       CATH ANGIO    Result Date: 2024  This exam has been completed. Please refer to Notes for the results to this procedure.    CARD ECHO 2D DOPPLER CONTRAST (CPT=93306)    Result Date: 2024  Transthoracic Echocardiogram Name:Billy Nielsen Date: 2024 :  1955 Ht:  (68in)  BP: 135 / 85 MRN:  [id number]    Age:  68years    Wt:  (213lb) HR: 58bpm Loc:  [facility]       Gndr: M          BSA: 2.1m^2 Sonographer: Steffany BAEZA Ordering:    Earle Zavala MD Consulting:  Guilherme Boles ---------------------------------------------------------------------------- History/Indications:   Chest pain.  Coronary artery disease. STEMI. ---------------------------------------------------------------------------- Procedure information:  A transthoracic complete 2D study was performed. Additional evaluation included M-mode, complete spectral Doppler, and color Doppler.  Patient status:  Inpatient.  Location:  [place] [address].    Comparison was made to the study of 2021.     This was a routine study. Transthoracic echocardiography for ventricular function evaluation and assessment of valvular function. Image quality was suboptimal. The study was technically limited due to poor acoustic window availability. Intravenous contrast (Definity) was administered to evaluate left ventricular function and opacify the LV. ---------------------------------------------------------------------------- Conclusions: 1. Procedure narrative: Transthoracic echocardiography for ventricular    function evaluation and assessment of valvular function. Image quality    was suboptimal. The study was technically limited due to poor acoustic    window availability. Intravenous contrast (Definity) was administered to    evaluate left ventricular function and opacify the LV. 2. Left ventricle: The cavity size was normal. Wall thickness was normal.    Systolic function was mildly reduced. The estimated ejection fraction was    40-45%, by visual assessment. Doppler parameters are consistent with    abnormal left ventricular relaxation - grade 1 diastolic dysfunction. 3. Left ventricle: There is akinesis of the mid anterior, basal to mid    anteroseptal walls 4. Right ventricle: The cavity size was increased. 5. Left atrium: The left atrial volume was normal. 6. Aortic root: The aortic root was 3.6cm diameter. The aortic root was    3.6cm diameter. 7. Ascending aorta: The ascending aorta was 4.2cm diameter. * ---------------------------------------------------------------------------- * Findings: Left ventricle:  The cavity size was normal. Wall thickness was normal. Systolic function was mildly reduced. The estimated ejection fraction was 40-45%, by visual assessment. Regional wall motion:  There is akinesis of the mid anterior, basal to mid anteroseptal walls Doppler parameters are consistent with abnormal left ventricular relaxation - grade 1 diastolic dysfunction. Left atrium:  The left atrial volume was normal.  Right ventricle:  The cavity size was increased. Systolic function was normal. Right atrium:  The atrium was normal in size. Mitral valve:  The valve was structurally normal. Leaflet separation was normal.  Doppler:  Transvalvular velocity was within the normal range. There was no evidence for stenosis. There was no significant regurgitation. Aortic valve:  The valve was structurally normal. The valve was trileaflet. Cusp separation was normal.  Doppler:  Transvalvular velocity was within the normal range. There was no evidence for stenosis. There was no significant regurgitation. Tricuspid valve:  The valve is structurally normal. Leaflet separation was normal.  Doppler:  Transvalvular velocity was within the normal range. There was no evidence for stenosis. There was no significant regurgitation. Pulmonic valve:   The valve is structurally normal. Cusp separation was normal.  Doppler:  Transvalvular velocity was within the normal range. There was no evidence for stenosis. There was mild regurgitation. Pericardium:   There was no pericardial effusion. Aorta: Aortic root: The aortic root was 3.6cm diameter. The aortic root was 3.6cm diameter. Ascending aorta: The ascending aorta was 4.2cm diameter. Systemic veins:  Central venous respirophasic diameter changes are in the normal range (>50%). Inferior vena cava: The IVC was normal-sized. ---------------------------------------------------------------------------- Measurements  Left ventricle                   Value        Ref  IVS thickness, ED, PLAX          0.8   cm     0.6 - 1.0  LV ID, ED, PLAX                  4.9   cm     4.2 - 5.8  LV ID, ES, PLAX                  3.4   cm     2.5 - 4.0  LV PW thickness, ED, PLAX        0.9   cm     0.6 - 1.0  IVS/LV PW ratio, ED, PLAX        0.98         ---------  LV PW/LV ID ratio, ED, PLAX      0.17         ---------  LV ejection fraction             59    %      52 - 72  LV e', lateral               (L) 4.5   cm/sec  >=10.0  LV E/e', lateral                 11           <=13  LV e', medial                (L) 5.3   cm/sec >=7.0  LV E/e', medial                  9            ---------  LV e', average                   4.9   cm/sec ---------  LV E/e', average                 10           <=14  LVOT                             Value        Ref  LVOT ID                          2.2   cm     ---------  Aortic root                      Value        Ref  Aortic root ID, ED               3.6   cm     2.7 - 4.2  Ascending aorta                  Value        Ref  Ascending aorta ID, A-P, ED  (H) 4.2   cm     2.2 - 3.8  Left atrium                      Value        Ref  LA ID, A-P, ES                   3.6   cm     3.0 - 4.0  LA volume, S                     37    ml     18 - 58  LA volume/bsa, S                 18    ml/m^2 16 - 34  LA volume, ES, 1-p A4C           36    ml     18 - 58  LA volume, ES, 1-p A2C           39    ml     18 - 58  LA volume, ES, A/L               40    ml     ---------  LA volume/bsa, ES, A/L           19    ml/m^2 16 - 34  LA/aortic root ratio             1            ---------  Mitral valve                     Value        Ref  Mitral E-wave peak velocity      0.49  m/sec  ---------  Mitral A-wave peak velocity      0.8   m/sec  ---------  Mitral E/A ratio, peak           0.6          ---------  Right ventricle                  Value        Ref  TAPSE, 2D                        2.50  cm     >=1.70  RV s', lateral                   12.8  cm/sec >=9.5  Pulmonic valve                   Value        Ref  Pulmonic regurg velocity, ED     1.25  m/sec  ---------  Pulmonic regurg gradient, ED     6     mm Hg  --------- Legend: (L)  and  (H)  luiz values outside specified reference range. ---------------------------------------------------------------------------- Prepared and electronically signed by Gustavo Diaz MD 09/17/2024 16:01     XR CHEST AP PORTABLE  (CPT=71045)    Result Date: 9/17/2024  PROCEDURE:  XR  CHEST AP PORTABLE  (CPT=71045)  TECHNIQUE:  AP chest radiograph was obtained.  COMPARISON:  [facility], EP CHEST - 2 VIEW, 7/29/2006, 8:38 AM.  INDICATIONS:  says hes havng a heart attack  PATIENT STATED HISTORY: (As transcribed by Technologist)  says amarjit ruiz a heart attack.    FINDINGS:  Cardiac size and pulmonary vasculature are within normal limits. No pleural effusions. No pneumothorax.  Left basilar atelectasis.  Minimal opacity within the left mid lung zone.             CONCLUSION:  1. Minimal opacity within left mid lung zone which may represent atelectasis although minimal developing infiltrates cannot be excluded.  2. Left basilar atelectasis. 3. Preliminary report was provided by [facility] at time of examination.  Final report confirms findings on preliminary report without discrepancy.    LOCATION:  [facility]      Dictated by (CST): Adalgisa Cadena MD on 9/17/2024 at 7:45 AM     Finalized by (CST): Adalgisa Cadena MD on 9/17/2024 at 7:45 AM       CATH ANGIO    Result Date: 9/17/2024  This exam has been completed. Please refer to Notes for the results to this procedure.       Available outpatient records reviewed--    ASSESSMENT / PLAN:   68-year-old man with history of recent STEMI with PCI to LAD as well as PCI to RCA and finding of HFrEF 45% who presents with shortness of breath and feeling of anxiety as well as some orthopnea.    Shortness of breath, orthopnea  - In setting of recent cardiac interventions and known reduced EF  - Discussed with cardiology, planning cardiac catheterization for reassessment tomorrow as well as repeat echo.  - Was given IV Lasix today, will assess for further need.  BNP was elevated on this admission  - By description and given negative medical workup thus far, seems to be that anxiety is playing a large role.  Will see if psych can see.  Trazodone for sleep has been ordered for now.  I believe that he would benefit from an SSRI and perhaps some as needed  Ativan in the short-term until the SSRI takes effect.    STEMI, CAD-recent PCI to LAD and RCA  - Continue aspirin, Plavix, beta-blocker, statin  -- trop elevated but could still be downtrending from before  -- cath tomorrow      Heart failure reduced EF 45%  - Will reassess with echo    Ckd, h/o nephrectomy- Cr in mid 1 range, stable    Scds, subcutaneous heparin      Nikki Vidal MD  [facility] Hospitalist  Pager [phone]  Answering Service number: [phone]

## 2024-10-01 NOTE — ED PROVIDER NOTES
Patient Seen in: [facility] In [place]      History     Chief Complaint   Patient presents with    Anxiety/Panic attack    Chest Pain Angina     Stated Complaint: anxiety attack    Subjective:   HPI    Patient 68-year-old male with coronary disease, chronic kidney disease, solitary kidney, found to have a STEMI couple weeks ago was admitted status post PCI to [facility] .  He says he had anxiety symptoms since hospitalization.  He was actually cathed a second time because of his symptoms which did not show any complications related to his stent.  He is on aspirin and Plavix.  States he had symptoms last night with trouble sleeping shortness of breath anxiety uneasiness which prompted him to come to the ER.    Objective:     Past Medical History:    Cancer (HCC)    right kidney cancer    Heart attack (HCC)    Kidney stone on left side              Past Surgical History:   Procedure Laterality Date    Colonoscopy,biopsy  10/11/2010    Performed by VALARIE ASTORGA at [facility], [facility]    Knee arthroscp harv      Laparo radical nephrectomy  01/01/2004    Other accessory      CARDIAC STENT PLACEMENT X3    Other surgical history       cysto, left rpg, left urs, left stent 6/15/13edw Dr Ashley    Other surgical history      Right knee arthroscopic meniscus repair                Social History     Socioeconomic History    Marital status:     Number of children: 2   Occupational History    Occupation: Contsruction   Tobacco Use    Smoking status: Never     Passive exposure: Never    Smokeless tobacco: Never   Vaping Use    Vaping status: Never Used   Substance and Sexual Activity    Alcohol use: Yes     Alcohol/week: 0.0 standard drinks of alcohol     Comment: SOCIAL/2 drinks per month    Drug use: No    Sexual activity: Yes     Partners: Female   Other Topics Concern    Sleep Concern No    Back Care No    Seat Belt Yes     Social Determinants of Health     Food Insecurity: No Food Insecurity  (9/17/2024)    Food Insecurity     Food Insecurity: Never true   Transportation Needs: No Transportation Needs (9/17/2024)    Transportation Needs     Lack of Transportation: No   Housing Stability: Low Risk  (9/17/2024)    Housing Stability     Housing Instability: No              Physical Exam     ED Triage Vitals [10/01/24 1106]   /82   Pulse 65   Resp 14   Temp 97.9 °F (36.6 °C)   Temp src Oral   SpO2 100 %   O2 Device None (Room air)       Current Vitals:   Vital Signs  BP: (!) 161/88  Pulse: 70  Resp: 16  Temp: 98 °F (36.7 °C)  Temp src: Temporal    Oxygen Therapy  SpO2: 100 %  O2 Device: None (Room air)        Physical Exam  Vitals and nursing note reviewed.   Constitutional:       General: He is not in acute distress.     Appearance: He is well-developed. He is not toxic-appearing.   HENT:      Head: Normocephalic and atraumatic.   Eyes:      General: No scleral icterus.     Conjunctiva/sclera: Conjunctivae normal.   Cardiovascular:      Rate and Rhythm: Normal rate.   Pulmonary:      Effort: Pulmonary effort is normal. No respiratory distress.   Abdominal:      General: There is no distension.   Musculoskeletal:         General: No tenderness. Normal range of motion.      Cervical back: Normal range of motion and neck supple.      Right lower leg: No edema.      Left lower leg: No edema.   Skin:     General: Skin is warm and dry.      Findings: No rash.   Neurological:      Mental Status: He is alert and oriented to person, place, and time.      Motor: No abnormal muscle tone.      Coordination: Coordination normal.   Psychiatric:         Behavior: Behavior normal.            ED Course     Labs Reviewed   COMP METABOLIC PANEL (14) - Abnormal; Notable for the following components:       Result Value    Creatinine 1.56 (*)     eGFR-Cr 49 (*)     All other components within normal limits   TROPONIN I HIGH SENSITIVITY - Abnormal; Notable for the following components:    Troponin I (High Sensitivity) 129  (*)     All other components within normal limits   PRO BETA NATRIURETIC PEPTIDE - Abnormal; Notable for the following components:    Pro-Beta Natriuretic Peptide 1,688 (*)     All other components within normal limits   CBC WITH DIFFERENTIAL WITH PLATELET   LIPID PANEL   RAINBOW DRAW LAVENDER   RAINBOW DRAW LIGHT GREEN   RAINBOW DRAW BLUE     EKG    Rate, intervals and axes as noted on EKG Report.  Rate: 62  Rhythm: Sinus Rhythm  Reading: Normal sinus rhythm with sinus arrhythmia, anterolateral T wave inversions, no significant change from September 19, 2024                    MDM      -Tracing on cardiac monitor and pulse oximetry was reviewed by myself.   -The cardiac monitor revealed normal sinus rhythm as interpreted by me. The cardiac monitor was ordered to monitor the patient for dysrhythmia  -Pulse oximetry was interpreted by me and was normal.  Pulse oximeter was ordered to monitor patient for hypoxia.           -Comorbidities did add complexity to the management are mentioned in the HPI above        -I personally reviewed the prior external notes and the medical record to obtain additional history I reviewed patient discharge summary September 20, 2024, patient admitted for STEMI and underwent PCI to LAD        -DDX: Includes but not limited to *acute coronary syndrome, anxiety-        -I personally reviewed the radiographs findings and they show no acute disease  Please refer to radiology report for official interpretation      Labs Reviewed   COMP METABOLIC PANEL (14) - Abnormal; Notable for the following components:       Result Value    Creatinine 1.56 (*)     eGFR-Cr 49 (*)     All other components within normal limits   TROPONIN I HIGH SENSITIVITY - Abnormal; Notable for the following components:    Troponin I (High Sensitivity) 129 (*)     All other components within normal limits   PRO BETA NATRIURETIC PEPTIDE - Abnormal; Notable for the following components:    Pro-Beta Natriuretic Peptide 1,688  (*)     All other components within normal limits   CBC WITH DIFFERENTIAL WITH PLATELET   LIPID PANEL   RAINBOW DRAW LAVENDER   RAINBOW DRAW LIGHT GREEN   RAINBOW DRAW BLUE     Medications   furosemide (Lasix) 10 mg/mL injection 40 mg (has no administration in time range)     Labs reviewed, patient has elevated troponin 129 elevated BNP 1688.  CBC is normal creatinine of 1.56.  And given patient ongoing symptoms, shortness of breath elevated BNP and troponin discussed with cardiology.  Patient will be admitted hospital service for further care.  Given Lasix in the ED.  Currently chest pain-free.    Admission disposition: 10/1/2024  1:00 PM           Medical Decision Making      Disposition and Plan     Clinical Impression:  1. Dyspnea, unspecified type    2. Status post insertion of drug-eluting stent into left anterior descending (LAD) artery for coronary artery disease         Disposition:  Admit  10/1/2024  1:00 pm    Follow-up:  No follow-up provider specified.        Medications Prescribed:  Current Discharge Medication List              Supplementary Documentation:         Hospital Problems       Present on Admission  Date Reviewed: 9/20/2024            ICD-10-CM Noted POA    * (Principal) Dyspnea, unspecified type R06.00 10/1/2024 Unknown

## 2024-10-01 NOTE — PROGRESS NOTES
10/01/24 1551 10/01/24 1553 10/01/24 1556   Vital Signs   /77 134/89 101/85   MAP (mmHg) 93 (!) 103 92   BP Location Left arm Right arm Right arm   BP Method Automatic Automatic Automatic   Patient Position Lying Sitting Standing     Received patient at 1550 from  ED. Denies pain or discomfort at present. Voiding per urinal post lasix in ED. Tolerating ambulation well. Denies pain or discomfort at present.  Alert and Oriented x4. Tele Rhythm NSR. O2 saturation 98% On room air. Breath sounds clear. Pt oriented to room and unit. Psych liaison consult for persistent anxiety/panic attacks post MI 2 weeks ago. Plan to trend troponin. Echo and cardiac cath tomorrow.  Reviewed plan of care and patient verbalizes understanding.

## 2024-10-01 NOTE — CONSULTS
Cardiology Consultation Note      Billy Nielsen Patient Status:  Emergency    1955 MRN [id number]   Location [facility] EMERGENCY DEPARTMENT IN [place] Attending Caryn Atkins MD   Hosp Day # 0 PCP Carlton Li MD     Reason for consultation:  Chest pain    Impression:  Chest pain  Orthopnea with elevated BNP but no JVP or CXR signs of fluid  Mildly elevated troponin  Recent STEMI in September with PCI to the LAD which was acutely occluded.  He had a staged PCI to the RCA 2 days later which was the nonculprit lesion  New HFrEF with EF 40 to 45% and akinesis of the anterior and anteroseptal walls.  Likely related to acute STEMI  CKD.  Creatinine close to baseline today  History of renal carcinoma  Anxiety post myocardial infarction    Plan:  Continue aspirin and Plavix  Trend troponin.  If it continues to rise we will start heparin drip.  Suspicion is low for new coronary event but given history and chest pain we will plan for coronary evaluation tomorrow with Dr. Zavala  Repeat echo given orthopnea and improvement in TITUS with lasix in the ED. Continue with IV lasix 40 mg daily   Continue metoprolol  Continue high-dose rosuvastatin  Repeat limited echo to assess EF  Telemetry  I discussed with the patient extensively about his mental health.  He is having extreme anxiety since his STEMI and this is adversely affecting his day-to-day life.  Some of this may be worsening heart failure given his orthopnea also we will check his echo and make sure his stents are patent.  If echo was stable and PCI's are patent and I recommended that he see psychiatry and be started on antidepressants.  He will be started on trazodone as an inpatient to help with his sleep.      History of Present Illness:  Billy Nielsen is a 68 year old male who presented to [facility] on 10/1/2024..    The patient has a medical history of a recent STEMI and post MI anxiety who presents with dyspnea and chest pressure.  Chest  pressure happened a few times since his discharge.  Overall he felt okay but from time to time he does have shortness of breath especially when he lies down.  Weights are stable and denies any edema.  Has been taking all of his medications..    Cardiology consultation was requested.    Medications:  No current facility-administered medications for this encounter.       Past Medical History:    Cancer (HCC)    right kidney cancer    Heart attack (HCC)    Kidney stone on left side       Past Surgical History:   Procedure Laterality Date    Colonoscopy,biopsy  10/11/2010    Performed by VALARIE ASTORGA at [facility] [place], [facility]    Knee arthroscp harv      Laparo radical nephrectomy  01/01/2004    Other accessory      CARDIAC STENT PLACEMENT X3    Other surgical history       cysto, left rpg, left urs, left stent 6/15/13edw Dr Ashley    Other surgical history      Right knee arthroscopic meniscus repair       Family History  There is no family history of sudden cardiac death.    Social History   reports that he has never smoked. He has never been exposed to tobacco smoke. He has never used smokeless tobacco. He reports current alcohol use. He reports that he does not use drugs.     Allergies  Allergies   Allergen Reactions    Nsaids OTHER (SEE COMMENTS)     Due to one kidney recommended not to take    Septra [Sulfamethoxazole W/Trimethoprim, Co-Trimoxazole] RASH and FEVER     Rash like a sunburn and temp         Review of Systems:  Constitutional: negative for fevers  Eyes: negative for visual disturbance  Ears, nose, mouth, throat, and face: negative for epistaxis  Respiratory: negative for dyspnea on exertion  Cardiovascular: negative for chest pain  Gastrointestinal: negative for melena  Genitourinary:negative for hematuria  Hematologic/lymphatic: negative for bleeding  Musculoskeletal:negative for myalgias  Neurological: negative for dizziness and headaches  Endocrine: negative for temperature  intolerance      Physical Exam:  Blood pressure 160/84, pulse 65, temperature 98 °F (36.7 °C), temperature source Temporal, resp. rate 16, height 5' 9\" (1.753 m), weight 204 lb (92.5 kg), SpO2 100%.  Temp (24hrs), Av °F (36.7 °C), Min:97.9 °F (36.6 °C), Max:98 °F (36.7 °C)    Wt Readings from Last 3 Encounters:   10/01/24 204 lb (92.5 kg)   24 201 lb 15.1 oz (91.6 kg)   21 213 lb (96.6 kg)       General: Awake and alert; in no acute distress  HEENT: Extraocular movements are intact; sclerae are anicteric; scalp is atrauamatic; no thyromegaly  Neck: Supple; no JVD; no carotid bruits  Cardiac: Regular rate and regular rhythm; no murmurs/rubs/gallops are appreciated; PMI is non-displaced; there is no evidence of a sternal heave  Lungs: Clear to auscultation bilaterally; no accessory muscle use is noted  Abdomen: Soft, non-tender; bowel sounds are normoactive; no hepatosplenomegaly  Extremities: No clubbing or cyanosis; moves all 4 extremities normally  Psychiatric: Normal mood and affect; answers questions appropriately  Dermatologic: No rashes; normal skin turgor    Diagnostic testing:    EKG: Normal sinus rhythm anteropseptal q waves     Labs:   No results found for: \"PT\", \"INR\"     Lab Results   Component Value Date    WBC 7.3 10/01/2024    HGB 16.2 10/01/2024    HCT 45.0 10/01/2024    .0 10/01/2024    CREATSERUM 1.56 10/01/2024    BUN 20 10/01/2024     10/01/2024    K 4.6 10/01/2024     10/01/2024    CO2 27.0 10/01/2024    GLU 98 10/01/2024    CA 9.7 10/01/2024    ALB 4.0 10/01/2024    ALKPHO 63 10/01/2024    BILT 0.7 10/01/2024    TP 7.2 10/01/2024    AST 17 10/01/2024    ALT 28 10/01/2024         Thank you for allowing our practice to participate in the care of your patient. Please do not hesitate to contact me if you have any questions.    Gustavo Diaz MD

## 2024-10-01 NOTE — ED INITIAL ASSESSMENT (HPI)
STATES HAD MI ON 9/17- DC HOME ON 9/21- HAD 3 STENTS PLACED- SINCE THEN HAS BEEN HAVING ANXIETY ATTACKS- STATES A FEW IN THE HOSPITAL AND SOME SINCE- WENT TO CARDIOLOGIST YESTERDAY AND HAD EKG BUT IT HAVING ANXIETY ATTACK AGAIN THIS AM- FEELS HOT AND COLD- DENIES CP BUT NOT FEELING RIGHT

## 2024-10-02 ENCOUNTER — APPOINTMENT (OUTPATIENT)
Dept: INTERVENTIONAL RADIOLOGY/VASCULAR | Facility: HOSPITAL | Age: 69
End: 2024-10-02
Attending: INTERNAL MEDICINE
Payer: COMMERCIAL

## 2024-10-02 ENCOUNTER — APPOINTMENT (OUTPATIENT)
Dept: CV DIAGNOSTICS | Facility: HOSPITAL | Age: 69
End: 2024-10-02
Attending: INTERNAL MEDICINE
Payer: COMMERCIAL

## 2024-10-02 PROBLEM — F41.9 ANXIETY DISORDER: Status: ACTIVE | Noted: 2024-10-02

## 2024-10-02 PROBLEM — G47.00 INSOMNIA: Status: ACTIVE | Noted: 2024-10-02

## 2024-10-02 LAB
ANION GAP SERPL CALC-SCNC: 12 MMOL/L (ref 0–18)
BUN BLD-MCNC: 24 MG/DL (ref 9–23)
CALCIUM BLD-MCNC: 10.1 MG/DL (ref 8.7–10.4)
CHLORIDE SERPL-SCNC: 103 MMOL/L (ref 98–112)
CO2 SERPL-SCNC: 24 MMOL/L (ref 21–32)
CREAT BLD-MCNC: 1.52 MG/DL
EGFRCR SERPLBLD CKD-EPI 2021: 50 ML/MIN/1.73M2 (ref 60–?)
GLUCOSE BLD-MCNC: 98 MG/DL (ref 70–99)
OSMOLALITY SERPL CALC.SUM OF ELEC: 292 MOSM/KG (ref 275–295)
POTASSIUM SERPL-SCNC: 4.1 MMOL/L (ref 3.5–5.1)
SODIUM SERPL-SCNC: 139 MMOL/L (ref 136–145)

## 2024-10-02 PROCEDURE — B211YZZ FLUOROSCOPY OF MULTIPLE CORONARY ARTERIES USING OTHER CONTRAST: ICD-10-PCS | Performed by: INTERNAL MEDICINE

## 2024-10-02 PROCEDURE — 93321 DOPPLER ECHO F-UP/LMTD STD: CPT | Performed by: INTERNAL MEDICINE

## 2024-10-02 PROCEDURE — 90792 PSYCH DIAG EVAL W/MED SRVCS: CPT

## 2024-10-02 PROCEDURE — 4A023N6 MEASUREMENT OF CARDIAC SAMPLING AND PRESSURE, RIGHT HEART, PERCUTANEOUS APPROACH: ICD-10-PCS | Performed by: INTERNAL MEDICINE

## 2024-10-02 PROCEDURE — 93325 DOPPLER ECHO COLOR FLOW MAPG: CPT | Performed by: INTERNAL MEDICINE

## 2024-10-02 PROCEDURE — 93308 TTE F-UP OR LMTD: CPT | Performed by: INTERNAL MEDICINE

## 2024-10-02 RX ORDER — HEPARIN SODIUM 5000 [USP'U]/ML
INJECTION, SOLUTION INTRAVENOUS; SUBCUTANEOUS
Status: COMPLETED
Start: 2024-10-02 | End: 2024-10-02

## 2024-10-02 RX ORDER — CLONAZEPAM 0.5 MG/1
0.5 TABLET ORAL NIGHTLY PRN
Status: DISCONTINUED | OUTPATIENT
Start: 2024-10-02 | End: 2024-10-03

## 2024-10-02 RX ORDER — VERAPAMIL HYDROCHLORIDE 2.5 MG/ML
INJECTION, SOLUTION INTRAVENOUS
Status: COMPLETED
Start: 2024-10-02 | End: 2024-10-02

## 2024-10-02 RX ORDER — NITROGLYCERIN 20 MG/100ML
INJECTION INTRAVENOUS
Status: COMPLETED
Start: 2024-10-02 | End: 2024-10-02

## 2024-10-02 RX ORDER — MIDAZOLAM HYDROCHLORIDE 1 MG/ML
INJECTION INTRAMUSCULAR; INTRAVENOUS
Status: COMPLETED
Start: 2024-10-02 | End: 2024-10-02

## 2024-10-02 RX ORDER — IODIXANOL 320 MG/ML
100 INJECTION, SOLUTION INTRAVASCULAR
Status: COMPLETED | OUTPATIENT
Start: 2024-10-02 | End: 2024-10-02

## 2024-10-02 RX ORDER — LIDOCAINE HYDROCHLORIDE 10 MG/ML
INJECTION, SOLUTION EPIDURAL; INFILTRATION; INTRACAUDAL; PERINEURAL
Status: COMPLETED
Start: 2024-10-02 | End: 2024-10-02

## 2024-10-02 RX ORDER — CLONAZEPAM 0.5 MG/1
0.5 TABLET ORAL NIGHTLY
Status: DISCONTINUED | OUTPATIENT
Start: 2024-10-02 | End: 2024-10-03

## 2024-10-02 NOTE — CONSULTS
[facility]  Report of Psychiatric Consultation    Billy Nielsen Patient Status:  Observation    1955 MRN [id number]   Location [facility] 8NE-A Attending Nikki Vidal MD   Hosp Day # 0 PCP Carlton Li MD     Date of Admission: 10/1/2024  Date of Consult: 10/2/2024  Reason for Consultation: Anxiety    Impression:    Primary Psychiatric Diagnosis:  Anxiety disorder, unspecified    Insomnia, unspecified     Rule Out Diagnoses:  Anxiety due to medical condition  JACY  Panic disorder    Personality Traits:  Deferred     Pertinent Medical Diagnoses:  CKD, History of nephrectomy, STEMI with PCI to LAD and PCI to RCA, recent heart failure    Recommendations:  Start on Klonopin 0.5 mg nightly with an additional PRN of 0.5 mg dose available if he is not a sleep after 1 hour from scheduled dose.  Will continue to discuss initiation of SSRI. Not interested at this time.   Psychotherapy referrals will be placed in discharge instructions.     DEONDRE Storey NP-C    History of Present Illness:  Patient was admitted to [facility] on 10/1/2024 after having complaints of feeling that he could not take a full breath and that it was feeling shallow. Reports previously feeling that he could control this and calm himself down but yesterday it \"made me feel like I was crawling out of my skin\". Reports that he contacted his doctor and they told him to come in for evaluation. Noted to have recent STEMI with PCI to LAD and PCI to RCA during last hospitalization of -2024.    Patient denies any depressive symptoms. Denies feeling hopeless, helpless, and worthless. Denies issues with motivation. Reports that he has had periodic issues with concentration. Has noted decrease in appetite. Reports that food does not taste the same. Has noted weight loss of 12-13 pounds since his last hospitalization when he was discharged on 2024. Does also admit issues with sleep. Reports having trouble \"turning  off my brain\". Denies any ruminations but more racing thoughts. Typically was sleeping from 10/11 PM until about 5 AM on workdays and 6/7 AM on days he was off. Denies history of panic attacks but reports it is possible what he was experiencing yesterday. Denies history of roseline. Denies psychosis.     Discussed concerns that patient might be having anxiety since post discharge. He does report symptoms such as racing thoughts, decreased appetite, and trouble with sleep. Reports history of having stressful events that have not resulted in him developing anxiety.     Discussed initiation of SSRI for treatment of anxiety. He has concerns of side effects from medication. Discussed that insomnia is likely worsening anxiety symptoms. Discussed initiation of a medication to aid in sleep. He reports that he received Trazodone last night and did not feel that it helped him fall asleep and was feeling groggy this morning. Reports that he felt that Ativan was beneficial when he was hospitalized last and he did not feel sleepy after. Discussed trial of Klonopin to help patient sleep. Patient is agreeable.     I have advised the patient/patient's authorized representative of the risks, benefits, alternatives, and potential side effects of the above listed psychotropic medication(s). I have explained the risks and benefits of the alternatives to the medication (including not taking any medication). The patient/patient's authorized representative has consented to administration of the above listed medication(s). The patient/patient's representative understands the right to refuse medication at any time. The patient provided informed consent to make the medication changes, as detailed above, after APRN reviewed the indications and/or rationale, alternatives, side effects, risks and benefits of the medication.  Patient/patient's representative verbalized understanding of the risks and other information discussed.    Past Psychiatric  History:  Denies.     Substance Use History:  Denies any history.    Psych Family History:  Denies.    Social and Developmental History:   Patient reports that he lives at home with his wife. He has two grown adult sons. He works for the [employer]. Hopeful to retire soon. Reports he is independent with ADLs. Denies having any interests or hobbies.     Past Medical History:    Anxiety state    Cancer (HCC)    right kidney cancer    Heart attack (HCC)    Kidney stone on left side     Past Surgical History:   Procedure Laterality Date    Cath drug eluting stent      Colonoscopy,biopsy  10/11/2010    Performed by VALARIE ASTORGA at [facility], [facility]    Knee arthroscp harv      Laparo radical nephrectomy  01/01/2004    Other accessory      CARDIAC STENT PLACEMENT X3    Other surgical history       cysto, left rpg, left urs, left stent 6/15/13edw Dr Ashley    Other surgical history      Right knee arthroscopic meniscus repair     Family History   Problem Relation Age of Onset    Cancer Father     Diabetes Mother     Other (Other) Mother       reports that he has never smoked. He has never been exposed to tobacco smoke. He has never used smokeless tobacco. He reports current alcohol use. He reports that he does not use drugs.    Allergies:  Allergies   Allergen Reactions    Nsaids OTHER (SEE COMMENTS)     Due to one kidney recommended not to take    Septra [Sulfamethoxazole W/Trimethoprim, Co-Trimoxazole] RASH and FEVER     Rash like a sunburn and temp       Medications:    Current Facility-Administered Medications:     influenza virus trivalent high dose PF (Fluzone HD) 0.5 mL injection (ages >/= 65 years) 0.5 mL, 0.5 mL, Intramuscular, Prior to discharge    sodium chloride 0.9% infusion, , Intravenous, On Call    heparin (Porcine) 5000 UNIT/ML injection 5,000 Units, 5,000 Units, Subcutaneous, Q8H HUMBERTO    acetaminophen (Tylenol Extra Strength) tab 500 mg, 500 mg, Oral, Q4H PRN    traZODone (Desyrel) tab 50 mg,  50 mg, Oral, Nightly    ondansetron (Zofran) 4 MG/2ML injection 4 mg, 4 mg, Intravenous, Q6H PRN    metoclopramide (Reglan) 5 mg/mL injection 5 mg, 5 mg, Intravenous, Q8H PRN    polyethylene glycol (PEG 3350) (Miralax) 17 g oral packet 17 g, 17 g, Oral, Daily PRN    sennosides (Senokot) tab 17.2 mg, 17.2 mg, Oral, Nightly PRN    bisacodyl (Dulcolax) 10 MG rectal suppository 10 mg, 10 mg, Rectal, Daily PRN    fleet enema (Fleet) rectal enema 133 mL, 1 enema, Rectal, Once PRN    albuterol (Ventolin HFA) 108 (90 Base) MCG/ACT inhaler 2 puff, 2 puff, Inhalation, Q6H PRN    aspirin DR tab 81 mg, 81 mg, Oral, Daily    clopidogrel (Plavix) tab 75 mg, 75 mg, Oral, Daily    metoprolol tartrate (Lopressor) tab 25 mg, 25 mg, Oral, 2x Daily(Beta Blocker)    rosuvastatin (Crestor) tab 40 mg, 40 mg, Oral, Nightly    Review of Systems   Respiratory:  Positive for shortness of breath.    Psychiatric/Behavioral:  Negative for depression, hallucinations, memory loss, substance abuse and suicidal ideas. The patient is nervous/anxious and has insomnia.    All other systems reviewed and are negative.      Psychiatry Review Systems: No voices or visions. No elevated mood, racing thoughts, decreased need for sleep, grandiose thoughts, increased participation in risky behaviors, or history of trauma.     Mental Status Exam:     Risk Assessment  Suicidal ideation: no suicidal ideation  Homicidal ideation: None noted    Appearance: unremarkable  Behavior: unremarkable  Attitude: cooperative and consistent  Gait: Not observed    Speech: normal rate, rhythm and volume    Mood: anxious  Affect: Congruent    Thought process: logical  Thought content: ruminations  Perceptions: no hallucinations  Associations: Intact    Orientation: Alert and oriented x 4  Attention and Concentration:   focused and attentive  Memory:  intact immediate, recent, remote  Language: Intact naming and repetition  Fund of Knowledge: Able to recite name of [place]  president    Insight: Fair to limited   Judgment: Fair     Objective    Vitals:    10/02/24 0540   BP: 111/77   Pulse: 65   Resp: 19   Temp: 98.1 °F (36.7 °C)     Patient Strengths/Assets:  Caring     Suicide Risk Assessments:  Overall level of suicide risk is low     Risk Factors: recent medical complications, anxiety     Environmental Factors: lives with wife, stable job    Protective Factors: family    Laboratory Data:  Lab Results   Component Value Date    WBC 7.3 10/01/2024    HGB 16.2 10/01/2024    HCT 45.0 10/01/2024    .0 10/01/2024    CREATSERUM 1.52 10/02/2024    BUN 24 10/02/2024     10/02/2024    K 4.1 10/02/2024     10/02/2024    CO2 24.0 10/02/2024    GLU 98 10/02/2024    CA 10.1 10/02/2024    ALB 4.0 10/01/2024    ALKPHO 63 10/01/2024    BILT 0.7 10/01/2024    TP 7.2 10/01/2024    AST 17 10/01/2024    ALT 28 10/01/2024       STUDIES:    Sandra MENDOSA NP-C

## 2024-10-02 NOTE — PLAN OF CARE
Assumed care of pt at 1900. Patient alert and oriented x 4. Up  ad citlaly. On RA. NSR on tele. Continent of bowel and bladder. No complaints of pain, shortness of breath or chest pain/discomfort. POC : trend troponin, echo, cardiac cath, psych liaison to see. Fall precautions in place. Call light within reach.    Problem: Patient/Family Goals  Goal: Patient/Family Long Term Goal  Description: Patient's Long Term Goal: to go home    Interventions:  - echo, tele monitoring, labs, increase activity    - See additional Care Plan goals for specific interventions  Outcome: Progressing  Goal: Patient/Family Short Term Goal  Description: Patient's Short Term Goal: feel better    Interventions:   - - echo, tele monitoring, labs, increase activity    - See additional Care Plan goals for specific interventions  Outcome: Progressing     Problem: CARDIOVASCULAR - ADULT  Goal: Maintains optimal cardiac output and hemodynamic stability  Description: INTERVENTIONS:  - Monitor vital signs, rhythm, and trends  - Monitor for bleeding, hypotension and signs of decreased cardiac output  - Evaluate effectiveness of vasoactive medications to optimize hemodynamic stability  - Monitor arterial and/or venous puncture sites for bleeding and/or hematoma  - Assess quality of pulses, skin color and temperature  - Assess for signs of decreased coronary artery perfusion - ex. Angina  - Evaluate fluid balance, assess for edema, trend weights  Outcome: Progressing  Goal: Absence of cardiac arrhythmias or at baseline  Description: INTERVENTIONS:  - Continuous cardiac monitoring, monitor vital signs, obtain 12 lead EKG if indicated  - Evaluate effectiveness of antiarrhythmic and heart rate control medications as ordered  - Initiate emergency measures for life threatening arrhythmias  - Monitor electrolytes and administer replacement therapy as ordered  Outcome: Progressing

## 2024-10-02 NOTE — PROGRESS NOTES
Pt returned from cath lab at 1515. R radial and L groin soft, no hematomas noted. 10cc in TR band, tape and gauze on L groin.

## 2024-10-02 NOTE — PROCEDURES
[facility]  Division of Cardiology  Cardiac Catheterization & Percutaneous Coronary Intervention  Billy Nielsen Location: [facility] Cath Lab    CSN [id number] MRN [id number]   Admission Date 10/1/2024 Procedure Date 10/2/2024   Attending Physician Nikki Vidal MD Procedure Physician Earle Zavala MD     PREOPERATIVE DIAGNOSIS:  CP, CAD, recent STEMI  POSTOPERATIVE DIAGNOSIS:  CAD  PROCEDURE PERFORMED:  Coronary angio, RHC      PROCEDURE:    Moderate sedation: The patient was brought to the cardiac catheterization lab in the fasting state.  Informed consent was previously obtained.  Moderate sedation was employed using 4mg IV Versed and 75mcg IV Fentanyl.  I directly observed the patient from 1420 to 1454, watching the heart rate, blood pressure, oximetry, and rhythm.  An independent, trained observer was present throughout the procedure and assisted in the monitoring of the patient's level of consciousness and physiologic states.  Please see the [facility] Catheterization Report (MCR) in [facility] for further, technical details.  Vascular access and catheter placement:  A 6F right radial, slender sheath was utilized after micropuncture access gained.  A Kasie catheter was used for LM and for RCA engagement and subsequent angiography.  Standard over the wire technique was utilized.  ARLET and JAMES angiographic views with caudal and cranial angulation were used for cineangiography.  Hemodynamics were measured in the standard fashion using fluid filled, pressure manometry via the ASSISTHomeTouch device and analyzed with the LaunchKey catheterization software.  I used a microkit for RCFV access and used 5F slender sheath and used a 5F Beatty-Win catheter for measuring pressures in the RA, RV, PA, PCW and we also calculated the Niraj CO/CI. At the end of the case, a TR band was used for radial arterial hemostasis and our standard protocol was followed. Digital pressure was used for venous hemostasis.        DIAGNOSTIC FINDINGS:    Coronary angiography:    LMCA: The left main artery is normal vessel.  LAD:  The left anterior descending artery is large vessel with stents from mid to distal.  Widely patent.  No fillilng defects.  A moderate diagonal is \"jailed\" but has excellent flow and angiographically has  no obstructive disease at the ostium.  Moderate distal disease but looks BETTER than before.  LCX: The left circumflex artery is moderate system with mild (30%) disease.   RCA:  The right coronary artery is dominant, large vessel with patent distal stent and widely patent PDA/HERBER with no ISR or defects noted.  Brisk flow noted.    MEDICATIONS:  See nursing records, MCR, and EMR.     COMPLICATIONS:  None.     IMPRESSION:    CAD  Recent STEMI  S/P PCI to LAD and RCA  Widely patent stents with no ISR or filling defects  Only mild to moderate disease otherwise    RECOMMENDATIONS:   Medically manage CAD as was being done.  DAPT needed. Consider non-cardiac etiologies of chest pain.  There may be an anxiety component contributing to his symptoms.  The coronaries appear non-obstructive with patent stents and brisk flow and overall better than they did 2 weeks ago.  Correlation suggested.    Earle Zavala MD

## 2024-10-02 NOTE — PROGRESS NOTES
Cardiology Progress Note    Billy Nielsen Patient Status:  Observation    1955 MRN [id number]   Location [facility] 8NE-A Attending Nikki Vidal MD   Hosp Day # 0 PCP Carlton Li MD     Reason for consultation:  Chest pain     Impression:  Chest pain  Orthopnea with elevated BNP but no JVP or CXR signs of fluid  Mildly elevated troponin  Recent STEMI in September with PCI to the LAD which was acutely occluded.  He had a staged PCI to the RCA 2 days later which was the nonculprit lesion  New HFrEF with EF 40 to 45% and akinesis of the anterior and anteroseptal walls.  Likely related to acute STEMI  CKD.  Creatinine close to baseline today  History of renal carcinoma  Anxiety post myocardial infarction     Plan:  Continue aspirin and Plavix  Minimal troponin leak.. Suspicion is low for new coronary event but given history and chest pain we will plan for coronary evaluation tomorrow with Dr. Zavala  Repeat echo given orthopnea and improvement in TITUS with lasix in the ED.  Continue metoprolol  Continue high-dose rosuvastatin  Repeat limited echo to assess EF  Telemetry  I discussed with the patient extensively about his mental health.  He is having extreme anxiety since his STEMI and this is adversely affecting his day-to-day life.  Some of this may be worsening heart failure given his orthopnea also we will check his echo and make sure his stents are patent.  If echo was stable and PCI's are patent and I recommended that he see psychiatry and be started on antidepressants.  He will be started on trazodone as an inpatient to help with his sleep.      Subjective:  Still feeling anxious   No chest pain     Objective:  /77 (BP Location: Left arm)   Pulse 65   Temp 98.1 °F (36.7 °C) (Tympanic)   Resp 19   Ht 5' 9\" (1.753 m)   Wt 196 lb 11.2 oz (89.2 kg)   SpO2 98%   BMI 29.05 kg/m²   Temp (24hrs), Av.1 °F (36.7 °C), Min:97.7 °F (36.5 °C), Max:98.5 °F (36.9 °C)      Intake/Output Summary  (Last 24 hours) at 10/2/2024 0803  Last data filed at 10/2/2024 0546  Gross per 24 hour   Intake 30 ml   Output 1 ml   Net 29 ml     Wt Readings from Last 3 Encounters:   10/02/24 196 lb 11.2 oz (89.2 kg)   09/20/24 201 lb 15.1 oz (91.6 kg)   12/02/21 213 lb (96.6 kg)       General: Awake and alert; in no acute distress  Cardiac: Regular rate and regular rhythm; no murmurs/rubs/gallops are appreciated  Lungs: Clear to auscultation bilaterally; no accessory muscle use  Abdomen: Soft, non-tender; bowel sounds are normoactive  Extremities: No clubbing/cyanosis; moves all 4 extremities normally    Current Facility-Administered Medications   Medication Dose Route Frequency    influenza virus trivalent high dose PF (Fluzone HD) 0.5 mL injection (ages >/= 65 years) 0.5 mL  0.5 mL Intramuscular Prior to discharge    sodium chloride 0.9% infusion   Intravenous On Call    heparin (Porcine) 5000 UNIT/ML injection 5,000 Units  5,000 Units Subcutaneous Q8H HUMBERTO    acetaminophen (Tylenol Extra Strength) tab 500 mg  500 mg Oral Q4H PRN    traZODone (Desyrel) tab 50 mg  50 mg Oral Nightly    ondansetron (Zofran) 4 MG/2ML injection 4 mg  4 mg Intravenous Q6H PRN    metoclopramide (Reglan) 5 mg/mL injection 5 mg  5 mg Intravenous Q8H PRN    polyethylene glycol (PEG 3350) (Miralax) 17 g oral packet 17 g  17 g Oral Daily PRN    sennosides (Senokot) tab 17.2 mg  17.2 mg Oral Nightly PRN    bisacodyl (Dulcolax) 10 MG rectal suppository 10 mg  10 mg Rectal Daily PRN    fleet enema (Fleet) rectal enema 133 mL  1 enema Rectal Once PRN    albuterol (Ventolin HFA) 108 (90 Base) MCG/ACT inhaler 2 puff  2 puff Inhalation Q6H PRN    aspirin DR tab 81 mg  81 mg Oral Daily    clopidogrel (Plavix) tab 75 mg  75 mg Oral Daily    metoprolol tartrate (Lopressor) tab 25 mg  25 mg Oral 2x Daily(Beta Blocker)    rosuvastatin (Crestor) tab 40 mg  40 mg Oral Nightly       Laboratory Data:  Lab Results   Component Value Date    WBC 7.3 10/01/2024    HGB 16.2  10/01/2024    HCT 45.0 10/01/2024    .0 10/01/2024     No results found for: \"INR\"  Lab Results   Component Value Date     10/02/2024    K 4.1 10/02/2024     10/02/2024    CO2 24.0 10/02/2024    BUN 24 10/02/2024    CREATSERUM 1.52 10/02/2024    GLU 98 10/02/2024    CA 10.1 10/02/2024       Telemetry: No malignant tachyarrhythmias or bradyarrhythmias      Thank you for allowing our practice to participate in the care of your patient. Please do not hesitate to contact me if you have any questions.    Gustavo Diaz MD

## 2024-10-02 NOTE — PROGRESS NOTES
[facility]  Hospitalist Progress Note                                                                     [facility]   part of [facility]        Billy Nielsen  12/4/1955    SUBJECTIVE:  Patient seen and examined.  No chest pain since yesterday.  Wife bedside.  Denies CP/SOB.  NAD.       OBJECTIVE:  Temp:  [97.7 °F (36.5 °C)-98.5 °F (36.9 °C)] 98.2 °F (36.8 °C)  Pulse:  [57-88] 57  Resp:  [14-19] 16  BP: ()/(59-92) 100/70  SpO2:  [94 %-100 %] 94 %  Exam  Gen: No acute distress, alert and oriented x3, no focal neurologic deficits  Pulm: Lungs clear bilaterally, normal respiratory effort  CV: Heart with regular rate and rhythm, no murmur.  Normal PMI.    Abd: Abdomen soft, nontender, nondistended, no organomegaly, bowel sounds present  MSK: Full range of motion in extremities, no clubbing, no cyanosis  Skin: no rashes or lesions    Labs:   Recent Labs   Lab 10/01/24  1118   WBC 7.3   HGB 16.2   MCV 84.9   .0       Recent Labs   Lab 10/01/24  1118 10/02/24  0516    139   K 4.6 4.1    103   CO2 27.0 24.0   BUN 20 24*   CREATSERUM 1.56* 1.52*   CA 9.7 10.1   GLU 98 98       Recent Labs   Lab 10/01/24  1118   ALT 28   AST 17   ALB 4.0       No results for input(s): \"PGLU\" in the last 168 hours.    Meds:   Scheduled:    sodium chloride   Intravenous On Call    heparin  5,000 Units Subcutaneous Q8H HUMBERTO    traZODone  50 mg Oral Nightly    aspirin  81 mg Oral Daily    clopidogrel  75 mg Oral Daily    metoprolol tartrate  25 mg Oral 2x Daily(Beta Blocker)    rosuvastatin  40 mg Oral Nightly     Continuous Infusions:   PRN:   influenza virus vaccine PF    acetaminophen    ondansetron    metoclopramide    polyethylene glycol (PEG 3350)    sennosides    bisacodyl    fleet enema    albuterol    Assessment/Plan:  Principal Problem:    Dyspnea, unspecified type  Active Problems:    Status post insertion of drug-eluting stent into left  anterior descending (LAD) artery for coronary artery disease    Elevated troponin    68-year-old man with history of recent STEMI with PCI to LAD as well as PCI to RCA and finding of HFrEF 45% who presents with shortness of breath and feeling of anxiety as well as some orthopnea.     Shortness of breath, orthopnea  - In setting of recent cardiac interventions and known reduced EF  - Discussed with cardiology, planning cardiac catheterization for reassessment today as well as repeat echo.  - s/p IV lasix in ED  - By description and given negative medical workup thus far, seems to be that anxiety is playing a large role.    - started on trazodone for sleep  - could benefit from SSRI, psychiatry consulted      STEMI, CAD-recent PCI to LAD and RCA  - Continue aspirin, Plavix, beta-blocker, statin  -- trop elevated but could still be downtrending from before  -- cath today     Heart failure reduced EF 45%  - Will reassess with echo - results pending      Ckd, h/o nephrectomy- Cr in mid 1 range, stable     Scds, subcutaneous heparin      Cruz Hernandez MD  [facility]  Message over [employer]  Pager: [phone]

## 2024-10-02 NOTE — PLAN OF CARE
Patient alert and oriented x4. Normal sinus rhythm on tele. Pt going to cath lab this afternoon. Denies chest pain or discomfort. Pt updated on plan of care, verbalized understanding. Call light and personal items within reach.     Problem: Patient/Family Goals  Goal: Patient/Family Long Term Goal  Description: Patient's Long Term Goal: to go home    Interventions:  - echo, tele monitoring, labs, increase activity    - See additional Care Plan goals for specific interventions  Outcome: Progressing  Goal: Patient/Family Short Term Goal  Description: Patient's Short Term Goal: feel better    Interventions:   - - echo, tele monitoring, labs, increase activity    - See additional Care Plan goals for specific interventions  Outcome: Progressing     Problem: CARDIOVASCULAR - ADULT  Goal: Maintains optimal cardiac output and hemodynamic stability  Description: INTERVENTIONS:  - Monitor vital signs, rhythm, and trends  - Monitor for bleeding, hypotension and signs of decreased cardiac output  - Evaluate effectiveness of vasoactive medications to optimize hemodynamic stability  - Monitor arterial and/or venous puncture sites for bleeding and/or hematoma  - Assess quality of pulses, skin color and temperature  - Assess for signs of decreased coronary artery perfusion - ex. Angina  - Evaluate fluid balance, assess for edema, trend weights  Outcome: Progressing  Goal: Absence of cardiac arrhythmias or at baseline  Description: INTERVENTIONS:  - Continuous cardiac monitoring, monitor vital signs, obtain 12 lead EKG if indicated  - Evaluate effectiveness of antiarrhythmic and heart rate control medications as ordered  - Initiate emergency measures for life threatening arrhythmias  - Monitor electrolytes and administer replacement therapy as ordered  Outcome: Progressing

## 2024-10-03 VITALS
RESPIRATION RATE: 16 BRPM | SYSTOLIC BLOOD PRESSURE: 129 MMHG | OXYGEN SATURATION: 97 % | DIASTOLIC BLOOD PRESSURE: 79 MMHG | TEMPERATURE: 98 F | WEIGHT: 196.69 LBS | HEIGHT: 69 IN | BODY MASS INDEX: 29.13 KG/M2 | HEART RATE: 67 BPM

## 2024-10-03 LAB
ANION GAP SERPL CALC-SCNC: 8 MMOL/L (ref 0–18)
ATRIAL RATE: 62 BPM
BASOPHILS # BLD AUTO: 0.1 X10(3) UL (ref 0–0.2)
BASOPHILS NFR BLD AUTO: 1.5 %
BUN BLD-MCNC: 24 MG/DL (ref 9–23)
CALCIUM BLD-MCNC: 9.7 MG/DL (ref 8.7–10.4)
CHLORIDE SERPL-SCNC: 104 MMOL/L (ref 98–112)
CO2 SERPL-SCNC: 24 MMOL/L (ref 21–32)
CREAT BLD-MCNC: 1.57 MG/DL
EGFRCR SERPLBLD CKD-EPI 2021: 48 ML/MIN/1.73M2 (ref 60–?)
EOSINOPHIL # BLD AUTO: 0.28 X10(3) UL (ref 0–0.7)
EOSINOPHIL NFR BLD AUTO: 4.1 %
ERYTHROCYTE [DISTWIDTH] IN BLOOD BY AUTOMATED COUNT: 12.5 %
GLUCOSE BLD-MCNC: 93 MG/DL (ref 70–99)
HCT VFR BLD AUTO: 42.7 %
HGB BLD-MCNC: 15.4 G/DL
IMM GRANULOCYTES # BLD AUTO: 0.01 X10(3) UL (ref 0–1)
IMM GRANULOCYTES NFR BLD: 0.1 %
LYMPHOCYTES # BLD AUTO: 1.3 X10(3) UL (ref 1–4)
LYMPHOCYTES NFR BLD AUTO: 19.3 %
MCH RBC QN AUTO: 30.6 PG (ref 26–34)
MCHC RBC AUTO-ENTMCNC: 36.1 G/DL (ref 31–37)
MCV RBC AUTO: 84.7 FL
MONOCYTES # BLD AUTO: 0.73 X10(3) UL (ref 0.1–1)
MONOCYTES NFR BLD AUTO: 10.8 %
NEUTROPHILS # BLD AUTO: 4.33 X10 (3) UL (ref 1.5–7.7)
NEUTROPHILS # BLD AUTO: 4.33 X10(3) UL (ref 1.5–7.7)
NEUTROPHILS NFR BLD AUTO: 64.2 %
OSMOLALITY SERPL CALC.SUM OF ELEC: 286 MOSM/KG (ref 275–295)
P AXIS: 29 DEGREES
P-R INTERVAL: 184 MS
PLATELET # BLD AUTO: 202 10(3)UL (ref 150–450)
POTASSIUM SERPL-SCNC: 4.1 MMOL/L (ref 3.5–5.1)
Q-T INTERVAL: 410 MS
QRS DURATION: 94 MS
QTC CALCULATION (BEZET): 416 MS
R AXIS: 98 DEGREES
RBC # BLD AUTO: 5.04 X10(6)UL
SODIUM SERPL-SCNC: 136 MMOL/L (ref 136–145)
T AXIS: 115 DEGREES
VENTRICULAR RATE: 62 BPM
WBC # BLD AUTO: 6.8 X10(3) UL (ref 4–11)

## 2024-10-03 RX ORDER — CLONAZEPAM 0.5 MG/1
0.5 TABLET ORAL NIGHTLY
Qty: 30 TABLET | Refills: 0 | Status: SHIPPED | OUTPATIENT
Start: 2024-10-03

## 2024-10-03 NOTE — PROGRESS NOTES
Cardiology Progress Note    Billy Nielsen Patient Status:  Observation    1955 MRN [id number]   Location [facility] 8NE-A Attending Nikki Vidal MD   Hosp Day # 0 PCP Carlton Li MD     Reason for consultation:  Chest pain     Impression:  Chest pain  Orthopnea with elevated BNP but no JVP or CXR signs of fluid  Mildly elevated troponin  Recent STEMI in September with PCI to the LAD which was acutely occluded.  He had a staged PCI to the RCA 2 days later which was the nonculprit lesion  New HFrEF with EF 40 to 45% and akinesis of the anterior and anteroseptal walls.  Likely related to acute STEMI  CKD.    History of renal carcinoma  Anxiety post myocardial infarction     Plan:  Continue aspirin and Plavix  Left heart cath with patent PCI's and no significant disease elsewhere.  Right heart cath with normal filling pressures and in fact with inspiration negative filling pressures suggestive of euvolemia to mild hypovolemia.  His symptoms of shortness of breath and anxiety have markedly improved on Klonopin and after stopping metoprolol.  I did discuss that there is benefit of beta-blockers post STEMI especially anterior STEMI but given his significant limitations on beta-blockers we will continue to hold off for now.  In addition his EF is actually improved to 50 to 55% from 40 to 45% from his previous echo 10 days ago.  Continue high-dose rosuvastatin  Telemetry  There is a component of anxiety along with all of this so he will be seeing psychiatry and psychology.  For now they have prescribed him Klonopin  Ok for DC    Subjective:  Markedly improved.  Ready to go home today  Objective:  /79 (BP Location: Left arm)   Pulse 67   Temp 98.3 °F (36.8 °C) (Oral)   Resp 16   Ht 5' 9\" (1.753 m)   Wt 196 lb 11.2 oz (89.2 kg)   SpO2 97%   BMI 29.05 kg/m²   Temp (24hrs), Av.5 °F (36.9 °C), Min:98.2 °F (36.8 °C), Max:98.9 °F (37.2 °C)      Intake/Output Summary (Last 24 hours) at  10/3/2024 1633  Last data filed at 10/3/2024 1300  Gross per 24 hour   Intake 1080 ml   Output 200 ml   Net 880 ml     Wt Readings from Last 3 Encounters:   10/02/24 196 lb 11.2 oz (89.2 kg)   09/20/24 201 lb 15.1 oz (91.6 kg)   12/02/21 213 lb (96.6 kg)       General: Awake and alert; in no acute distress  Cardiac: Regular rate and regular rhythm; no murmurs/rubs/gallops are appreciated  Lungs: Clear to auscultation bilaterally; no accessory muscle use  Abdomen: Soft, non-tender; bowel sounds are normoactive  Extremities: No clubbing/cyanosis; moves all 4 extremities normally    Current Facility-Administered Medications   Medication Dose Route Frequency    clonazePAM (KlonoPIN) tab 0.5 mg  0.5 mg Oral Nightly    And    clonazePAM (KlonoPIN) tab 0.5 mg  0.5 mg Oral Nightly PRN    influenza virus trivalent high dose PF (Fluzone HD) 0.5 mL injection (ages >/= 65 years) 0.5 mL  0.5 mL Intramuscular Prior to discharge    heparin (Porcine) 5000 UNIT/ML injection 5,000 Units  5,000 Units Subcutaneous Q8H HUMBERTO    acetaminophen (Tylenol Extra Strength) tab 500 mg  500 mg Oral Q4H PRN    ondansetron (Zofran) 4 MG/2ML injection 4 mg  4 mg Intravenous Q6H PRN    metoclopramide (Reglan) 5 mg/mL injection 5 mg  5 mg Intravenous Q8H PRN    polyethylene glycol (PEG 3350) (Miralax) 17 g oral packet 17 g  17 g Oral Daily PRN    sennosides (Senokot) tab 17.2 mg  17.2 mg Oral Nightly PRN    bisacodyl (Dulcolax) 10 MG rectal suppository 10 mg  10 mg Rectal Daily PRN    fleet enema (Fleet) rectal enema 133 mL  1 enema Rectal Once PRN    albuterol (Ventolin HFA) 108 (90 Base) MCG/ACT inhaler 2 puff  2 puff Inhalation Q6H PRN    aspirin DR tab 81 mg  81 mg Oral Daily    clopidogrel (Plavix) tab 75 mg  75 mg Oral Daily    metoprolol tartrate (Lopressor) tab 25 mg  25 mg Oral 2x Daily(Beta Blocker)    rosuvastatin (Crestor) tab 40 mg  40 mg Oral Nightly       Laboratory Data:  Lab Results   Component Value Date    WBC 6.8 10/03/2024    HGB  15.4 10/03/2024    HCT 42.7 10/03/2024    .0 10/03/2024     No results found for: \"INR\"  Lab Results   Component Value Date     10/03/2024    K 4.1 10/03/2024     10/03/2024    CO2 24.0 10/03/2024    BUN 24 10/03/2024    CREATSERUM 1.57 10/03/2024    GLU 93 10/03/2024    CA 9.7 10/03/2024       Telemetry: No malignant tachyarrhythmias or bradyarrhythmias      Thank you for allowing our practice to participate in the care of your patient. Please do not hesitate to contact me if you have any questions.    Gustavo Diaz MD

## 2024-10-03 NOTE — DISCHARGE SUMMARY
[facility] Hospitalist Discharge Summary     Patient ID:  Billy Nielsen  68 year old  12/4/1955    Admit date: 10/1/2024    Discharge date and time: 10/03/24     Attending Physician: Nikki Vidal MD     Primary Care Physician: Carlton Li MD     Discharge Diagnoses: Elevated troponin [R79.89]  Status post insertion of drug-eluting stent into left anterior descending (LAD) artery for coronary artery disease [Z95.5]  Dyspnea, unspecified type [R06.00]    Please note that only IHP DMG and EMG patients enrolled in the Medicare ACO, [facility] ACO and [facility] HMOs will be handled by the [facility] Care Management team.  For all other patients, please follow usual protocol for discharge care transition.    Discharge Condition: stable    Disposition:  home    Important Follow up:  - PCP within 2 weeks              Hospital Course:        68 year old male with PMH sig for ckd/h/o nephrectomy, recent STEMI with PCI to LAD that was occluded and then had staged PCI to RCA 2 days later, recent HFref 40-45% who presented with sob, dyspnea/orthopnea.  Patient reports subjective sensation of feeling like he cannot take a full breath or that it is shallow.  He felt like he could control it and calm himself before but not today. He reported that he felt like he was climbing out of his skin and hot/cold and could not sit still. He does note that when trying to lay down he feels more concern with his breathing, today received IV lasix and this seemed to help at first. He had started discussing some of his anxiety and breathing symptoms with his pcp after last hospitalization and was advised to start taking trazodone at night for sleep. He had received ativan a couple of times in hospital here before. He also reported that food doesn't taste the same and he has been eating less.     Shortness of breath, orthopnea  - In setting of recent cardiac interventions and known reduced EF  -  Discussed with cardiology, planning cardiac catheterization for reassessment today as well as repeat echo.  - s/p IV lasix in ED  - By description and given negative medical workup thus far, seems to be that anxiety is playing a large role.    - started on trazodone prn for sleep  - could benefit from SSRI, psychiatry consulted - pt declined ssri, psychiatry discharged with klonipin prn with outpatient psychiatry referrals      STEMI, CAD-recent PCI to LAD and RCA  - Continue aspirin, Plavix, beta-blocker, statin  -- trop elevated but could still be downtrending from before  -- s/p [facility] 10/2 with widely patent stents, see report copied below      Heart failure reduced EF 45%  - Will reassess with echo - results with improving EF  - dc metoprolol per patient request       Ckd, h/o nephrectomy- Cr in mid 1 range, stable      Consults: IP CONSULT TO CARDIOLOGY  IP CONSULT TO HOSPITALIST  IP CONSULT TO PSYCHIATRY    Operative Procedures:    Coronary angiogram 10/2:  DIAGNOSTIC FINDINGS:    Coronary angiography:    LMCA: The left main artery is normal vessel.  LAD:  The left anterior descending artery is large vessel with stents from mid to distal.  Widely patent.  No fillilng defects.  A moderate diagonal is \"jailed\" but has excellent flow and angiographically has  no obstructive disease at the ostium.  Moderate distal disease but looks BETTER than before.  LCX: The left circumflex artery is moderate system with mild (30%) disease.   RCA:  The right coronary artery is dominant, large vessel with patent distal stent and widely patent PDA/HERBER with no ISR or defects noted.  Brisk flow noted.     MEDICATIONS:  See nursing records, MCR, and EMR.     COMPLICATIONS:  None.     IMPRESSION:    CAD  Recent STEMI  S/P PCI to LAD and RCA  Widely patent stents with no ISR or filling defects  Only mild to moderate disease otherwise     RECOMMENDATIONS:   Medically manage CAD as was being done.  DAPT needed. Consider non-cardiac  etiologies of chest pain.  There may be an anxiety component contributing to his symptoms.  The coronaries appear non-obstructive with patent stents and brisk flow and overall better than they did 2 weeks ago.  Correlation suggested.    Patient instructions:      I as the attending physician reconciled the current and discharge medications on day of discharge.     Current Discharge Medication List        CONTINUE these medications which have NOT CHANGED    Details   aspirin 81 MG Oral Tab EC Take 1 tablet (81 mg total) by mouth daily.      clopidogrel 75 MG Oral Tab Take 1 tablet (75 mg total) by mouth daily.      rosuvastatin 40 MG Oral Tab Take 1 tablet (40 mg total) by mouth nightly.      metoprolol tartrate 25 MG Oral Tab Take 1 tablet (25 mg total) by mouth 2x Daily(Beta Blocker).      albuterol (PROAIR HFA) 108 (90 Base) MCG/ACT Inhalation Aero Soln Inhale 2 puffs into the lungs 4 (four) times daily.             Activity: activity as tolerated  Diet: regular diet  Wound Care: as directed  Code Status: No Order      Discharge Exam:     General: no acute distress, alert and oriented x 3  Heart: RRR  Lungs: clear bilaterally, no active wheezing  Abdomen: nontender, nondistended, intact BS  Extremities: no pedal edema   Neuro: CN inact, no focal deficits      Total time coordinating care for discharge: Greater than 30 minutes    Cruz Hernandez MD  [facility]

## 2024-10-03 NOTE — PLAN OF CARE
A&Ox4. Klonopin given, pt slept good overnight. Denies feeling anxious/panic attacks. O2 sat WNL on RA. SR on tele. R wrist and left groin soft, no hematoma. Continent of bladder and bowel. Up ad citlaly.    Plan: discharge.     Problem: Patient/Family Goals  Goal: Patient/Family Long Term Goal  Description: Patient's Long Term Goal: to go home    Interventions:  - echo, tele monitoring, labs, increase activity    - See additional Care Plan goals for specific interventions  Outcome: Progressing  Goal: Patient/Family Short Term Goal  Description: Patient's Short Term Goal: feel better    Interventions:   - - echo, tele monitoring, labs, increase activity    - See additional Care Plan goals for specific interventions  Outcome: Progressing     Problem: CARDIOVASCULAR - ADULT  Goal: Maintains optimal cardiac output and hemodynamic stability  Description: INTERVENTIONS:  - Monitor vital signs, rhythm, and trends  - Monitor for bleeding, hypotension and signs of decreased cardiac output  - Evaluate effectiveness of vasoactive medications to optimize hemodynamic stability  - Monitor arterial and/or venous puncture sites for bleeding and/or hematoma  - Assess quality of pulses, skin color and temperature  - Assess for signs of decreased coronary artery perfusion - ex. Angina  - Evaluate fluid balance, assess for edema, trend weights  Outcome: Progressing  Goal: Absence of cardiac arrhythmias or at baseline  Description: INTERVENTIONS:  - Continuous cardiac monitoring, monitor vital signs, obtain 12 lead EKG if indicated  - Evaluate effectiveness of antiarrhythmic and heart rate control medications as ordered  - Initiate emergency measures for life threatening arrhythmias  - Monitor electrolytes and administer replacement therapy as ordered  Outcome: Progressing

## 2024-10-03 NOTE — PROGRESS NOTES
[facility]  Report of Psychiatric Progress Note    Billy Nielsen Patient Status:  Observation    1955 MRN [id number]   Location [facility] 8NE-A Attending Nikki Vidal MD   Hosp Day # 0 PCP Carlton Li MD     Date of Admission: 10/1/2024  Date of Service: 10/3/2024  Reason for Consultation: Anxiety    Impression:    Primary Psychiatric Diagnosis:  Anxiety disorder, unspecified    Insomnia, unspecified     Rule Out Diagnoses:  Anxiety due to medical condition  JACY  Panic disorder    Personality Traits:  Deferred     Pertinent Medical Diagnoses:  CKD, History of nephrectomy, STEMI with PCI to LAD and PCI to RCA, recent heart failure    Recommendations:  Continue on Klonopin 0.5 mg nightly. He did not take the additional PRN dose. This will be discontinued. Will send script to his pharmacy.  Will continue to discuss initiation of SSRI. Not interested at this time. Feels that much of his anxiety is secondary to side effects from metoprolol.   Psychotherapy and psychiatry referrals will be placed in discharge instructions by [facility].   May discharge from psychiatric perspective.     DEONDRE Storey NP-C    History of Present Illness:  Patient was admitted to [facility] on 10/1/2024 after having complaints of feeling that he could not take a full breath and that it was feeling shallow. Reports previously feeling that he could control this and calm himself down but yesterday it \"made me feel like I was crawling out of my skin\". Reports that he contacted his doctor and they told him to come in for evaluation. Noted to have recent STEMI with PCI to LAD and PCI to RCA during last hospitalization of -2024.    Patient denies any depressive symptoms. Denies feeling hopeless, helpless, and worthless. Denies issues with motivation. Reports that he has had periodic issues with concentration. Has noted decrease in appetite. Reports that food does not taste the same. Has noted weight loss of  12-13 pounds since his last hospitalization when he was discharged on 9/20/2024. Does also admit issues with sleep. Reports having trouble \"turning off my brain\". Denies any ruminations but more racing thoughts. Typically was sleeping from 10/11 PM until about 5 AM on workdays and 6/7 AM on days he was off. Denies history of panic attacks but reports it is possible what he was experiencing yesterday. Denies history of roseline. Denies psychosis.     Discussed concerns that patient might be having anxiety since post discharge. He does report symptoms such as racing thoughts, decreased appetite, and trouble with sleep. Reports history of having stressful events that have not resulted in him developing anxiety.     Discussed initiation of SSRI for treatment of anxiety. He has concerns of side effects from medication. Discussed that insomnia is likely worsening anxiety symptoms. Discussed initiation of a medication to aid in sleep. He reports that he received Trazodone last night and did not feel that it helped him fall asleep and was feeling groggy this morning. Reports that he felt that Ativan was beneficial when he was hospitalized last and he did not feel sleepy after. Discussed trial of Klonopin to help patient sleep. Patient is agreeable.     I have advised the patient/patient's authorized representative of the risks, benefits, alternatives, and potential side effects of the above listed psychotropic medication(s). I have explained the risks and benefits of the alternatives to the medication (including not taking any medication). The patient/patient's authorized representative has consented to administration of the above listed medication(s). The patient/patient's representative understands the right to refuse medication at any time. The patient provided informed consent to make the medication changes, as detailed above, after APRN reviewed the indications and/or rationale, alternatives, side effects, risks and  benefits of the medication.  Patient/patient's representative verbalized understanding of the risks and other information discussed.    Interval History  10/3/2024 - He reports that he did sleep well last night. Not too lethargic this morning. He reports that he only took 1 dose of the Klonopin. Reports that he had noticed that his symptoms of anxiety/lightheadedness increased after taking the Metoprolol. Reports that he decided not to take it last night and feels more at ease. Encouraged him to speak with Cardiology about this concern. He is open to psychiatry and psychotherapy referrals being placed in discharge instructions. Cleared to discharge from psychiatric perspective.     Past Psychiatric History:  Denies.     Substance Use History:  Denies any history.    Psych Family History:  Denies.    Social and Developmental History:   Patient reports that he lives at home with his wife. He has two grown adult sons. He works for the [employer]. Hopeful to retire soon. Reports he is independent with ADLs. Denies having any interests or hobbies.     Past Medical History:    Anxiety state    Cancer (HCC)    right kidney cancer    Heart attack (HCC)    Kidney stone on left side     Past Surgical History:   Procedure Laterality Date    Cath drug eluting stent      Colonoscopy,biopsy  10/11/2010    Performed by VALARIE ASTORGA at [facility], [facility]    Knee arthroscp harv      Laparo radical nephrectomy  01/01/2004    Other accessory      CARDIAC STENT PLACEMENT X3    Other surgical history       cysto, left rpg, left urs, left stent 6/15/13edw Dr Ashley    Other surgical history      Right knee arthroscopic meniscus repair     Family History   Problem Relation Age of Onset    Cancer Father     Diabetes Mother     Other (Other) Mother       reports that he has never smoked. He has never been exposed to tobacco smoke. He has never used smokeless tobacco. He reports current alcohol use. He reports that he does not  use drugs.    Allergies:  Allergies   Allergen Reactions    Nsaids OTHER (SEE COMMENTS)     Due to one kidney recommended not to take    Septra [Sulfamethoxazole W/Trimethoprim, Co-Trimoxazole] RASH and FEVER     Rash like a sunburn and temp       Medications:    Current Facility-Administered Medications:     clonazePAM (KlonoPIN) tab 0.5 mg, 0.5 mg, Oral, Nightly **AND** clonazePAM (KlonoPIN) tab 0.5 mg, 0.5 mg, Oral, Nightly PRN    influenza virus trivalent high dose PF (Fluzone HD) 0.5 mL injection (ages >/= 65 years) 0.5 mL, 0.5 mL, Intramuscular, Prior to discharge    heparin (Porcine) 5000 UNIT/ML injection 5,000 Units, 5,000 Units, Subcutaneous, Q8H HUMBERTO    acetaminophen (Tylenol Extra Strength) tab 500 mg, 500 mg, Oral, Q4H PRN    ondansetron (Zofran) 4 MG/2ML injection 4 mg, 4 mg, Intravenous, Q6H PRN    metoclopramide (Reglan) 5 mg/mL injection 5 mg, 5 mg, Intravenous, Q8H PRN    polyethylene glycol (PEG 3350) (Miralax) 17 g oral packet 17 g, 17 g, Oral, Daily PRN    sennosides (Senokot) tab 17.2 mg, 17.2 mg, Oral, Nightly PRN    bisacodyl (Dulcolax) 10 MG rectal suppository 10 mg, 10 mg, Rectal, Daily PRN    fleet enema (Fleet) rectal enema 133 mL, 1 enema, Rectal, Once PRN    albuterol (Ventolin HFA) 108 (90 Base) MCG/ACT inhaler 2 puff, 2 puff, Inhalation, Q6H PRN    aspirin DR tab 81 mg, 81 mg, Oral, Daily    clopidogrel (Plavix) tab 75 mg, 75 mg, Oral, Daily    metoprolol tartrate (Lopressor) tab 25 mg, 25 mg, Oral, 2x Daily(Beta Blocker)    rosuvastatin (Crestor) tab 40 mg, 40 mg, Oral, Nightly    Review of Systems   Respiratory:  Positive for shortness of breath.    Psychiatric/Behavioral:  Negative for depression, hallucinations, memory loss, substance abuse and suicidal ideas. The patient is nervous/anxious and has insomnia.    All other systems reviewed and are negative.      Psychiatry Review Systems: No voices or visions. No elevated mood, racing thoughts, decreased need for sleep, grandiose  thoughts, increased participation in risky behaviors, or history of trauma.     Mental Status Exam:     Risk Assessment  Suicidal ideation: no suicidal ideation  Homicidal ideation: None noted    Appearance: unremarkable  Behavior: unremarkable  Attitude: cooperative and consistent  Gait: Not observed    Speech: normal rate, rhythm and volume    Mood: anxious  Affect: Congruent    Thought process: logical  Thought content: ruminations  Perceptions: no hallucinations  Associations: Intact    Orientation: Alert and oriented x 4  Attention and Concentration:   focused and attentive  Memory:  intact immediate, recent, remote  Language: Intact naming and repetition  Fund of Knowledge: Able to recite name of [place] president    Insight: Fair to limited   Judgment: Fair     Objective    Vitals:    10/03/24 0545   BP: 111/71   Pulse: 62   Resp: 18   Temp: 98.3 °F (36.8 °C)     Patient Strengths/Assets:  Caring     Suicide Risk Assessments:  Overall level of suicide risk is low     Risk Factors: recent medical complications, anxiety     Environmental Factors: lives with wife, stable job    Protective Factors: family    Laboratory Data:  Lab Results   Component Value Date    WBC 6.8 10/03/2024    HGB 15.4 10/03/2024    HCT 42.7 10/03/2024    .0 10/03/2024    CREATSERUM 1.57 10/03/2024    BUN 24 10/03/2024     10/03/2024    K 4.1 10/03/2024     10/03/2024    CO2 24.0 10/03/2024    GLU 93 10/03/2024    CA 9.7 10/03/2024       STUDIES:    Sandra LEE

## 2024-10-03 NOTE — DISCHARGE INSTRUCTIONS
HOME CARE INSTRUCTIONS FOLLOWING CORONARY ANGIOGRAPHY      Activity  DO NOT drive after the procedure.  You may resume driving late the following day according to the nurse or physician's instructions  Plan on resting and relaxing tonight and tomorrow  Resume your normal activity after 48 hours, or as instructed by your physician  Do not lift anything over 10 pounds for the next 24 hours  Avoid drinking alcohol for the next 24 hours  If the groin site was used, avoid repeated stair use and excessive walking for the next 24 hours  If the wrist was used, avoid bending/flexing of the wrist for the next 24 hours     What is Normal?  A small lump at the procedure site associated with mild tenderness when touched  The procedure site may be bruised or discolored  There may be a small amount of drainage on the bandage     Special Instructions  Drink plenty of fluids during the next 24 hours to \"flush\" the contrast from your system  The bandage is to remain in place for 24 hours  Keep the bandage clean and dry  DO NOT submerge the procedure site for 72 hours (no bath tubs or pools)  This includes dishwashing/submersion of the wrist, if the wrist was used  After 24 hours, you must remove the bandage  You should shower after removing the bandage, and wash the procedure site gently with soap and water  If you choose to wear a bandage for a few days, make sure it remains clean and dry and that it is changed daily     When you should NOTIFY YOUR PHYSICIAN  Bleeding can occur at the procedure site - both on the outside of the skin and/or beneath the surface of the skin  Swelling or a large lump at the procedure site can occur, which may be accompanied by moderate to severe pain     If either of the above occurs, lie down flat.  Have someone apply pressure to the procedure site with both hands, as instructed by the nurse.  Hold pressure for 20 minutes and the bleeding should stop.  Notify your physician of the occurrence  If the  bleeding does not stop, call 911 and continue to apply pressure     If you experience signs of a fever, temperature > 101°, chills, infection (redness, swelling, thick yellow drainage, or a foul odor from the procedure site)  If you notice any numbness, tingling, or loss of feeling to your leg or foot or groin access  If you notice any numbness, tingling, or loss of feeling to your fingers or hand, if wrist access was utilized     If You Received a Stent:     You will remain on an antiplatelet drug and/or aspirin.  Antiplatelet medications are usually taken for six months to one year and should not be stopped unless your cardiologist directs you to do so.  These medications help to prevent blockage at the stent site.  If another physician or dentist asks you to stop your antiplatelet medication, you need to consult your cardiologist first.  Together, your cardiologist and other physician can discuss the risks that may be involved if you are not taking the antiplatelet medication   If an MRI is necessary, it may be done 4-6 weeks after your procedure.  Verify this with your cardiologist  Keep your stent card with you at all times!  If you need an MRI in the future, your stent card will need to be shown to the technologist before performing the MRI.  A duplicate card CANNOT be reproduced.     Other  You may resume your present diet, unless otherwise specified by your physician.  You may resume all of your medications as prescribed, unless otherwise directed by your physician.  A list of your medications was provided to you at discharge.  Continue the walking program initiated in the hospital and progress your walking as directed.  Or, gradually resume your previous aerobic exercise schedule as tolerated.  Please call your physician’s office for a follow-up appointment.  You should be seen in 2 weeks.      Below is a list of referrals that you may follow-up with for treatment.  If you feel unsafe with self or  experience an increase in symptoms, please return to nearest ED or call 911.     [facility]  [address]  [place] [address]  [phone]    [address] [address]  [phone]    [facility]  [address], [address] [address]  [phone]    [facility]  [address],   [place] [address]  [phone]    [facility]  [address]  [place] [address]  [phone]    [facility]  [address]  [place], [place] [id number] [phone]    [facility]  [address], [address]  [place] [address]  [phone]    [facility]  [address], [place] [address]  [phone]

## 2024-10-04 NOTE — PLAN OF CARE
Received bedside report on this Pt at 0735. Pt awake, A&Ox4.  Pt was in SR on Tele monitor all shift, sats greater than 92% on RA. Both cath sites are C/D/I, soft. Pt up ad citlaly in room, ambulated in halls.  Called [facility] to ask about discharge plan, Dr. Diaz came and saw Pt.  Discharge instructions reviewed with and given to Pt, his questions answered and he verbalized understanding. Pt discharged to home, escorted to vehicle at [facility] by this writer. Pt left with his wife, and took his personal belongings.

## 2024-10-21 ENCOUNTER — ORDER TRANSCRIPTION (OUTPATIENT)
Dept: CARDIAC REHAB | Facility: HOSPITAL | Age: 69
End: 2024-10-21

## 2024-10-21 DIAGNOSIS — Z95.5 STENTED CORONARY ARTERY: Primary | ICD-10-CM

## 2025-06-04 ENCOUNTER — OCC HEALTH (OUTPATIENT)
Dept: OTHER | Facility: HOSPITAL | Age: 70
End: 2025-06-04
Attending: NURSE PRACTITIONER

## 2025-06-04 ENCOUNTER — HOSPITAL ENCOUNTER (OUTPATIENT)
Dept: GENERAL RADIOLOGY | Facility: HOSPITAL | Age: 70
Discharge: HOME | End: 2025-06-04
Attending: NURSE PRACTITIONER

## 2025-06-04 DIAGNOSIS — S59.912A INJURY OF LEFT FOREARM: ICD-10-CM

## 2025-06-04 DIAGNOSIS — S59.912A INJURY OF LEFT FOREARM: Primary | ICD-10-CM

## 2025-06-04 PROCEDURE — 73090 X-RAY EXAM OF FOREARM: CPT | Performed by: NURSE PRACTITIONER

## (undated) NOTE — LETTER
February 26, 2021    Sienna Allen  [address] [place] [address],[address] [address]      Dear Sharad [place]:    The following are the results of your recent tests ordered by Dr. Mari Gloria. Please review the list of test results.   Your result is the number on th

## (undated) NOTE — LETTER
[address]  [address]  Consent for Procedure/Sedation  Date: 10/1/2024         Time: 1730    I hereby authorize Dr Zavala, my physician and his/her assistants (if applicable), which may include medical students, residents, and/or fellows, to perform the following surgical operation/ procedure and administer such anesthesia as may be determined necessary by my physician:  Operation/Procedure name (s)  Cardiac Catheterization, Left Ventricular Cineangiography, Bilateral Selective Coronary Angiography and/or Right Heart Catheterization; possible Percutaneous Transluminal Coronary Angioplasty, Coronary Atherectomy, Coronary Stent, Intracoronary Thrombolytic therapy, Antiplatelet therapy and/or Intravascular Ultrasound on Billy Nielsen   2.   I recognize that during the surgical operation/procedure, unforeseen conditions may necessitate additional or different procedures than those listed above.  I, therefore, further authorize and request that the above-named surgeon, assistants, or designees perform such procedures as are, in their judgment, necessary and desirable.    3.   My surgeon/physician has discussed prior to my surgery the potential benefits, risks and side effects of this procedure; the likelihood of achieving goals; and potential problems that might occur during recuperation.  They also discussed reasonable alternatives to the procedure, including risks, benefits, and side effects related to the alternatives and risks related to not receiving this procedure.  I have had all my questions answered and I acknowledge that no guarantee has been made as to the result that may be obtained.    4.   Should the need arise during my operation/procedure, which includes change of level of care prior to discharge, I also consent to the administration of blood and/or blood products.  Further, I understand that despite careful testing and screening of blood or blood products by  collecting agencies, I may still be subject to ill effects as a result of receiving a blood transfusion and/or blood products.  The following are some, but not all, of the potential risks that can occur: fever and allergic reactions, hemolytic reactions, transmission of diseases such as Hepatitis, AIDS and Cytomegalovirus (CMV) and fluid overload.  In the event that I wish to have an autologous transfusion of my own blood, or a directed donor transfusion, I will discuss this with my physician.  Check only if Refusing Blood or Blood Products  I understand refusal of blood or blood products as deemed necessary by my physician may have serious consequences to my condition to include possible death. I hereby assume responsibility for my refusal and release the hospital, its personnel, and my physicians from any responsibility for the consequences of my refusal.          o  Refuse      5.   I authorize the use of any specimen, organs, tissues, body parts or foreign objects that may be removed from my body during the operation/procedure for diagnosis, research or teaching purposes and their subsequent disposal by hospital authorities.  I also authorize the release of specimen test results and/or written reports to my treating physician on the hospital medical staff or other referring or consulting physicians involved in my care, at the discretion of the Pathologist or my treating physician.    6.   I consent to the photographing or videotaping of the operations or procedures to be performed, including appropriate portions of my body for medical, scientific, or educational purposes, provided my identity is not revealed by the pictures or by descriptive texts accompanying them.  If the procedure has been photographed/videotaped, the surgeon will obtain the original picture, image, videotape or CD.  The hospital will not be responsible for storage, release or maintenance of the picture, image, tape or CD.    7.   I consent  to the presence of a  or observers in the operating room as deemed necessary by my physician or their designees.    8.   I recognize that in the event my procedure results in extended X-Ray/fluoroscopy time, I may develop a skin reaction.    9. If I have a Do Not Attempt Resuscitation (DNAR) order in place, that status will be suspended while in the operating room, procedural suite, and during the recovery period unless otherwise explicitly stated by me (or a person authorized to consent on my behalf). The surgeon or my attending physician will determine when the applicable recovery period ends for purposes of reinstating the DNAR order.  10. Patients having a sterilization procedure: I understand that if the procedure is successful the results will be permanent and it will therefore be impossible for me to inseminate, conceive, or bear children.  I also understand that the procedure is intended to result in sterility, although the result has not been guaranteed.   11. I acknowledge that my physician has explained sedation/analgesia administration to me including the risk and benefits I consent to the administration of sedation/analgesia as may be necessary or desirable in the judgment of my physician.    I CERTIFY THAT I HAVE READ AND FULLY UNDERSTAND THE ABOVE CONSENT TO OPERATION and/or OTHER PROCEDURE.      ____________________________________       _________________________________      ______________________________  Signature of Patient         Signature of Responsible Person        Printed Name of Responsible Person   ____________________________________      _________________________________      ______________________________       Signature of Witness          Relationship to Patient                       Date                                       Time  Patient Name: Billy Nielsen  : 1955    Reviewed: 2024   Printed: 2024  Medical Record #: [id number] Page 1 of  1

## (undated) NOTE — LETTER
[address]  [address]  Consent for Procedure/Sedation  Date: 09/19/2024         Time: 10:30 AM    I hereby authorize Dr. Zavala, my physician and his/her assistants (if applicable), which may include medical students, residents, and/or fellows, to perform the following surgical operation/ procedure and administer such anesthesia as may be determined necessary by my physician:  Operation/Procedure name (s)  Cardiac Catheterization, Left Ventricular Cineangiography, Bilateral Selective Coronary Angiography and/or Right Heart Catheterization; possible Percutaneous Transluminal Coronary Angioplasty, Coronary Atherectomy, Coronary Stent, Intracoronary Thrombolytic therapy, Antiplatelet therapy and/or Intravascular Ultrasound on Billy Nielsen   2.   I recognize that during the surgical operation/procedure, unforeseen conditions may necessitate additional or different procedures than those listed above.  I, therefore, further authorize and request that the above-named surgeon, assistants, or designees perform such procedures as are, in their judgment, necessary and desirable.    3.   My surgeon/physician has discussed prior to my surgery the potential benefits, risks and side effects of this procedure; the likelihood of achieving goals; and potential problems that might occur during recuperation.  They also discussed reasonable alternatives to the procedure, including risks, benefits, and side effects related to the alternatives and risks related to not receiving this procedure.  I have had all my questions answered and I acknowledge that no guarantee has been made as to the result that may be obtained.    4.   Should the need arise during my operation/procedure, which includes change of level of care prior to discharge, I also consent to the administration of blood and/or blood products.  Further, I understand that despite careful testing and screening of blood or blood products by  collecting agencies, I may still be subject to ill effects as a result of receiving a blood transfusion and/or blood products.  The following are some, but not all, of the potential risks that can occur: fever and allergic reactions, hemolytic reactions, transmission of diseases such as Hepatitis, AIDS and Cytomegalovirus (CMV) and fluid overload.  In the event that I wish to have an autologous transfusion of my own blood, or a directed donor transfusion, I will discuss this with my physician.  Check only if Refusing Blood or Blood Products  I understand refusal of blood or blood products as deemed necessary by my physician may have serious consequences to my condition to include possible death. I hereby assume responsibility for my refusal and release the hospital, its personnel, and my physicians from any responsibility for the consequences of my refusal.          o  Refuse      5.   I authorize the use of any specimen, organs, tissues, body parts or foreign objects that may be removed from my body during the operation/procedure for diagnosis, research or teaching purposes and their subsequent disposal by hospital authorities.  I also authorize the release of specimen test results and/or written reports to my treating physician on the hospital medical staff or other referring or consulting physicians involved in my care, at the discretion of the Pathologist or my treating physician.    6.   I consent to the photographing or videotaping of the operations or procedures to be performed, including appropriate portions of my body for medical, scientific, or educational purposes, provided my identity is not revealed by the pictures or by descriptive texts accompanying them.  If the procedure has been photographed/videotaped, the surgeon will obtain the original picture, image, videotape or CD.  The hospital will not be responsible for storage, release or maintenance of the picture, image, tape or CD.    7.   I consent  to the presence of a  or observers in the operating room as deemed necessary by my physician or their designees.    8.   I recognize that in the event my procedure results in extended X-Ray/fluoroscopy time, I may develop a skin reaction.    9. If I have a Do Not Attempt Resuscitation (DNAR) order in place, that status will be suspended while in the operating room, procedural suite, and during the recovery period unless otherwise explicitly stated by me (or a person authorized to consent on my behalf). The surgeon or my attending physician will determine when the applicable recovery period ends for purposes of reinstating the DNAR order.  10. Patients having a sterilization procedure: I understand that if the procedure is successful the results will be permanent and it will therefore be impossible for me to inseminate, conceive, or bear children.  I also understand that the procedure is intended to result in sterility, although the result has not been guaranteed.   11. I acknowledge that my physician has explained sedation/analgesia administration to me including the risk and benefits I consent to the administration of sedation/analgesia as may be necessary or desirable in the judgment of my physician.    I CERTIFY THAT I HAVE READ AND FULLY UNDERSTAND THE ABOVE CONSENT TO OPERATION and/or OTHER PROCEDURE.      ____________________________________       _________________________________      ______________________________  Signature of Patient         Signature of Responsible Person        Printed Name of Responsible Person   ____________________________________      _________________________________      ______________________________       Signature of Witness          Relationship to Patient                       Date                                       Time  Patient Name: Billy Nielsen  : 1955    Reviewed: 2024   Printed: 2024  Medical Record #: [id number] Page 1  of 1

## (undated) NOTE — LETTER
Patient Name: Rekha Martinez  YOB: 1955          MRN number:  [id number]  Date:  6/12/2018  Referring Physician:  Tommie De Paz     Progress  Summary    Dx: Left shoulder strain (M88.872S)               Authorized # of Visits:  6 visits Objective:   1. L Shoulder ROM: flexion 160/175 with no pain; abd 160/ 175 without pain at end range. After stretching, L shoulder ER 85 with pain at end range at top of shoulder joint, L IR 85 with pain at end range.     2.Tender at L long head and short via fax as soon as possible to [phone]. If you have any questions, please contact me at Dept: [phone].     Sincerely,  Electronically signed by therapist: Demi Chavez, PT    [de-identified] certification required: Yes  I certify the need for these se

## (undated) NOTE — LETTER
24      Billy Nielsen  :  1955      To Whom It May Concern:    This patient was seen at [facility] from 24 until 24 .  Work status:  Remain off work, temporary total disability for the next 2 weeks (until 10/7/24).      May return to work status per above effective 10/7/24.    If I may be of further assistance, please do not hesitate to contact me at the number below.    Sincerely,      Earle Zavala MD  Department of Cardiology  [facility]/[place] [id number]      Earle Zavala MD  [facility]  Division of Cardiology  [address] , [address]  [place]  [address]  Ph: [phone]